# Patient Record
Sex: FEMALE | Race: WHITE | NOT HISPANIC OR LATINO | URBAN - METROPOLITAN AREA
[De-identification: names, ages, dates, MRNs, and addresses within clinical notes are randomized per-mention and may not be internally consistent; named-entity substitution may affect disease eponyms.]

---

## 2015-06-26 RX ORDER — LEVOTHYROXINE SODIUM 125 MCG
1 TABLET ORAL
Qty: 0 | Refills: 0 | DISCHARGE
Start: 2015-06-26

## 2019-12-11 ENCOUNTER — INPATIENT (INPATIENT)
Facility: HOSPITAL | Age: 84
LOS: 6 days | Discharge: HOME | DRG: 391 | End: 2019-12-18
Attending: STUDENT IN AN ORGANIZED HEALTH CARE EDUCATION/TRAINING PROGRAM | Admitting: HOSPITALIST
Payer: MEDICARE

## 2019-12-11 VITALS
HEART RATE: 101 BPM | SYSTOLIC BLOOD PRESSURE: 122 MMHG | WEIGHT: 229.94 LBS | OXYGEN SATURATION: 94 % | RESPIRATION RATE: 18 BRPM | TEMPERATURE: 98 F | HEIGHT: 64 IN | DIASTOLIC BLOOD PRESSURE: 66 MMHG

## 2019-12-11 DIAGNOSIS — Z98.89 OTHER SPECIFIED POSTPROCEDURAL STATES: Chronic | ICD-10-CM

## 2019-12-11 DIAGNOSIS — K52.9 NONINFECTIVE GASTROENTERITIS AND COLITIS, UNSPECIFIED: ICD-10-CM

## 2019-12-11 LAB
ALBUMIN SERPL ELPH-MCNC: 2.7 G/DL — LOW (ref 3.3–5)
ALP SERPL-CCNC: 118 U/L — SIGNIFICANT CHANGE UP (ref 40–120)
ALT FLD-CCNC: 10 U/L — SIGNIFICANT CHANGE UP (ref 10–45)
ANION GAP SERPL CALC-SCNC: 12 MMOL/L — SIGNIFICANT CHANGE UP (ref 5–17)
ANION GAP SERPL CALC-SCNC: 20 MMOL/L — HIGH (ref 5–17)
APPEARANCE UR: ABNORMAL
APTT BLD: 39.7 SEC — HIGH (ref 27.5–36.3)
AST SERPL-CCNC: 16 U/L — SIGNIFICANT CHANGE UP (ref 10–40)
BASE EXCESS BLDV CALC-SCNC: 13.3 MMOL/L — HIGH (ref -2–2)
BASE EXCESS BLDV CALC-SCNC: 14.4 MMOL/L — HIGH (ref -2–2)
BASOPHILS # BLD AUTO: 0.1 K/UL — SIGNIFICANT CHANGE UP (ref 0–0.2)
BASOPHILS NFR BLD AUTO: 0.5 % — SIGNIFICANT CHANGE UP (ref 0–2)
BILIRUB SERPL-MCNC: 0.6 MG/DL — SIGNIFICANT CHANGE UP (ref 0.2–1.2)
BILIRUB UR-MCNC: NEGATIVE — SIGNIFICANT CHANGE UP
BUN SERPL-MCNC: 39 MG/DL — HIGH (ref 7–23)
BUN SERPL-MCNC: 40 MG/DL — HIGH (ref 7–23)
CA-I SERPL-SCNC: 1.07 MMOL/L — LOW (ref 1.12–1.3)
CA-I SERPL-SCNC: 1.13 MMOL/L — SIGNIFICANT CHANGE UP (ref 1.12–1.3)
CALCIUM SERPL-MCNC: 9 MG/DL — SIGNIFICANT CHANGE UP (ref 8.4–10.5)
CALCIUM SERPL-MCNC: 9.6 MG/DL — SIGNIFICANT CHANGE UP (ref 8.4–10.5)
CHLORIDE BLDV-SCNC: 89 MMOL/L — LOW (ref 96–108)
CHLORIDE BLDV-SCNC: 93 MMOL/L — LOW (ref 96–108)
CHLORIDE SERPL-SCNC: 83 MMOL/L — LOW (ref 96–108)
CHLORIDE SERPL-SCNC: 88 MMOL/L — LOW (ref 96–108)
CO2 BLDV-SCNC: 41 MMOL/L — HIGH (ref 22–30)
CO2 BLDV-SCNC: 42 MMOL/L — HIGH (ref 22–30)
CO2 SERPL-SCNC: 31 MMOL/L — SIGNIFICANT CHANGE UP (ref 22–31)
CO2 SERPL-SCNC: 36 MMOL/L — HIGH (ref 22–31)
COLOR SPEC: SIGNIFICANT CHANGE UP
CREAT SERPL-MCNC: 1.33 MG/DL — HIGH (ref 0.5–1.3)
CREAT SERPL-MCNC: 1.36 MG/DL — HIGH (ref 0.5–1.3)
DIFF PNL FLD: NEGATIVE — SIGNIFICANT CHANGE UP
EOSINOPHIL # BLD AUTO: 0.09 K/UL — SIGNIFICANT CHANGE UP (ref 0–0.5)
EOSINOPHIL NFR BLD AUTO: 0.5 % — SIGNIFICANT CHANGE UP (ref 0–6)
GAS PNL BLDV: 129 MMOL/L — LOW (ref 135–145)
GAS PNL BLDV: 135 MMOL/L — SIGNIFICANT CHANGE UP (ref 135–145)
GAS PNL BLDV: SIGNIFICANT CHANGE UP
GLUCOSE BLDV-MCNC: 112 MG/DL — HIGH (ref 70–99)
GLUCOSE BLDV-MCNC: 116 MG/DL — HIGH (ref 70–99)
GLUCOSE SERPL-MCNC: 115 MG/DL — HIGH (ref 70–99)
GLUCOSE SERPL-MCNC: 123 MG/DL — HIGH (ref 70–99)
GLUCOSE UR QL: NEGATIVE — SIGNIFICANT CHANGE UP
HCO3 BLDV-SCNC: 40 MMOL/L — HIGH (ref 21–29)
HCO3 BLDV-SCNC: 40 MMOL/L — HIGH (ref 21–29)
HCT VFR BLD CALC: 34.7 % — SIGNIFICANT CHANGE UP (ref 34.5–45)
HCT VFR BLDA CALC: 32 % — LOW (ref 39–50)
HCT VFR BLDA CALC: 36 % — LOW (ref 39–50)
HGB BLD CALC-MCNC: 10.2 G/DL — LOW (ref 11.5–15.5)
HGB BLD CALC-MCNC: 11.8 G/DL — SIGNIFICANT CHANGE UP (ref 11.5–15.5)
HGB BLD-MCNC: 11.2 G/DL — LOW (ref 11.5–15.5)
IMM GRANULOCYTES NFR BLD AUTO: 0.8 % — SIGNIFICANT CHANGE UP (ref 0–1.5)
INR BLD: 2.11 RATIO — HIGH (ref 0.88–1.16)
KETONES UR-MCNC: NEGATIVE — SIGNIFICANT CHANGE UP
LACTATE BLDV-MCNC: 1.6 MMOL/L — SIGNIFICANT CHANGE UP (ref 0.7–2)
LACTATE BLDV-MCNC: 2.6 MMOL/L — HIGH (ref 0.7–2)
LEUKOCYTE ESTERASE UR-ACNC: ABNORMAL
LIDOCAIN IGE QN: 16 U/L — SIGNIFICANT CHANGE UP (ref 7–60)
LYMPHOCYTES # BLD AUTO: 0.9 K/UL — LOW (ref 1–3.3)
LYMPHOCYTES # BLD AUTO: 4.6 % — LOW (ref 13–44)
MCHC RBC-ENTMCNC: 24.9 PG — LOW (ref 27–34)
MCHC RBC-ENTMCNC: 32.3 GM/DL — SIGNIFICANT CHANGE UP (ref 32–36)
MCV RBC AUTO: 77.3 FL — LOW (ref 80–100)
MONOCYTES # BLD AUTO: 1.22 K/UL — HIGH (ref 0–0.9)
MONOCYTES NFR BLD AUTO: 6.2 % — SIGNIFICANT CHANGE UP (ref 2–14)
NEUTROPHILS # BLD AUTO: 17.28 K/UL — HIGH (ref 1.8–7.4)
NEUTROPHILS NFR BLD AUTO: 87.4 % — HIGH (ref 43–77)
NITRITE UR-MCNC: NEGATIVE — SIGNIFICANT CHANGE UP
NRBC # BLD: 0 /100 WBCS — SIGNIFICANT CHANGE UP (ref 0–0)
PCO2 BLDV: 54 MMHG — HIGH (ref 35–50)
PCO2 BLDV: 61 MMHG — HIGH (ref 35–50)
PH BLDV: 7.43 — SIGNIFICANT CHANGE UP (ref 7.35–7.45)
PH BLDV: 7.48 — HIGH (ref 7.35–7.45)
PH UR: 6.5 — SIGNIFICANT CHANGE UP (ref 5–8)
PLATELET # BLD AUTO: 269 K/UL — SIGNIFICANT CHANGE UP (ref 150–400)
PO2 BLDV: 29 MMHG — SIGNIFICANT CHANGE UP (ref 25–45)
PO2 BLDV: 35 MMHG — SIGNIFICANT CHANGE UP (ref 25–45)
POTASSIUM BLDV-SCNC: 3 MMOL/L — LOW (ref 3.5–5.3)
POTASSIUM BLDV-SCNC: 3.2 MMOL/L — LOW (ref 3.5–5.3)
POTASSIUM SERPL-MCNC: 2.9 MMOL/L — CRITICAL LOW (ref 3.5–5.3)
POTASSIUM SERPL-MCNC: 3.4 MMOL/L — LOW (ref 3.5–5.3)
POTASSIUM SERPL-SCNC: 2.9 MMOL/L — CRITICAL LOW (ref 3.5–5.3)
POTASSIUM SERPL-SCNC: 3.4 MMOL/L — LOW (ref 3.5–5.3)
PROT SERPL-MCNC: 7.3 G/DL — SIGNIFICANT CHANGE UP (ref 6–8.3)
PROT UR-MCNC: NEGATIVE — SIGNIFICANT CHANGE UP
PROTHROM AB SERPL-ACNC: 24.6 SEC — HIGH (ref 10–12.9)
RBC # BLD: 4.49 M/UL — SIGNIFICANT CHANGE UP (ref 3.8–5.2)
RBC # FLD: 18 % — HIGH (ref 10.3–14.5)
SAO2 % BLDV: 48 % — LOW (ref 67–88)
SAO2 % BLDV: 61 % — LOW (ref 67–88)
SODIUM SERPL-SCNC: 134 MMOL/L — LOW (ref 135–145)
SODIUM SERPL-SCNC: 136 MMOL/L — SIGNIFICANT CHANGE UP (ref 135–145)
SP GR SPEC: 1.01 — SIGNIFICANT CHANGE UP (ref 1.01–1.02)
UROBILINOGEN FLD QL: NEGATIVE — SIGNIFICANT CHANGE UP
WBC # BLD: 19.74 K/UL — HIGH (ref 3.8–10.5)
WBC # FLD AUTO: 19.74 K/UL — HIGH (ref 3.8–10.5)

## 2019-12-11 PROCEDURE — 99284 EMERGENCY DEPT VISIT MOD MDM: CPT

## 2019-12-11 PROCEDURE — 71045 X-RAY EXAM CHEST 1 VIEW: CPT | Mod: 26

## 2019-12-11 PROCEDURE — 74177 CT ABD & PELVIS W/CONTRAST: CPT | Mod: 26

## 2019-12-11 PROCEDURE — 99223 1ST HOSP IP/OBS HIGH 75: CPT

## 2019-12-11 RX ORDER — POTASSIUM CHLORIDE 20 MEQ
10 PACKET (EA) ORAL
Refills: 0 | Status: COMPLETED | OUTPATIENT
Start: 2019-12-11 | End: 2019-12-12

## 2019-12-11 RX ORDER — HYDROMORPHONE HYDROCHLORIDE 2 MG/ML
1 INJECTION INTRAMUSCULAR; INTRAVENOUS; SUBCUTANEOUS ONCE
Refills: 0 | Status: DISCONTINUED | OUTPATIENT
Start: 2019-12-11 | End: 2019-12-11

## 2019-12-11 RX ORDER — CIPROFLOXACIN LACTATE 400MG/40ML
VIAL (ML) INTRAVENOUS
Refills: 0 | Status: DISCONTINUED | OUTPATIENT
Start: 2019-12-11 | End: 2019-12-12

## 2019-12-11 RX ORDER — CIPROFLOXACIN LACTATE 400MG/40ML
400 VIAL (ML) INTRAVENOUS EVERY 12 HOURS
Refills: 0 | Status: DISCONTINUED | OUTPATIENT
Start: 2019-12-12 | End: 2019-12-12

## 2019-12-11 RX ORDER — METRONIDAZOLE 500 MG
500 TABLET ORAL ONCE
Refills: 0 | Status: COMPLETED | OUTPATIENT
Start: 2019-12-11 | End: 2019-12-11

## 2019-12-11 RX ORDER — POTASSIUM CHLORIDE 20 MEQ
40 PACKET (EA) ORAL ONCE
Refills: 0 | Status: COMPLETED | OUTPATIENT
Start: 2019-12-11 | End: 2019-12-11

## 2019-12-11 RX ORDER — CIPROFLOXACIN LACTATE 400MG/40ML
400 VIAL (ML) INTRAVENOUS ONCE
Refills: 0 | Status: COMPLETED | OUTPATIENT
Start: 2019-12-11 | End: 2019-12-11

## 2019-12-11 RX ORDER — SODIUM CHLORIDE 9 MG/ML
1000 INJECTION INTRAMUSCULAR; INTRAVENOUS; SUBCUTANEOUS ONCE
Refills: 0 | Status: COMPLETED | OUTPATIENT
Start: 2019-12-11 | End: 2019-12-11

## 2019-12-11 RX ADMIN — HYDROMORPHONE HYDROCHLORIDE 1 MILLIGRAM(S): 2 INJECTION INTRAMUSCULAR; INTRAVENOUS; SUBCUTANEOUS at 23:58

## 2019-12-11 RX ADMIN — SODIUM CHLORIDE 1000 MILLILITER(S): 9 INJECTION INTRAMUSCULAR; INTRAVENOUS; SUBCUTANEOUS at 20:18

## 2019-12-11 RX ADMIN — Medication 200 MILLIGRAM(S): at 23:37

## 2019-12-11 RX ADMIN — Medication 40 MILLIEQUIVALENT(S): at 20:17

## 2019-12-11 RX ADMIN — Medication 100 MILLIEQUIVALENT(S): at 23:58

## 2019-12-11 NOTE — H&P ADULT - NSHPSOCIALHISTORY_GEN_ALL_CORE
Social History:    Marital Status:  (  x )    (   ) Single    (   )    (  )   Occupation: retired  Lives with: (  ) alone  (  ) children   ( x ) spouse   (  ) parents  (  ) other    Substance Use (street drugs): ( x ) never used  (  ) other:  Tobacco Usage:  (   ) never smoked   ( x  ) former smoker   (   ) current smoker  (     ) pack year  (        ) last cigarette date  Alcohol Usage: denies

## 2019-12-11 NOTE — ED ADULT NURSE REASSESSMENT NOTE - NS ED NURSE REASSESS COMMENT FT1
Pt received from [facility] in [place], Pt AOx3  breathing even unlabored spontaneously, pt SPO2 between 88-94%, pt not c/o SOB/ dyspnea, placed on 2L O2 NC for low pulse ox.

## 2019-12-11 NOTE — ED ADULT NURSE NOTE - PSH
H/O heart surgery    History of open heart surgery  biosynthetic aortic 2014  Knee fracture, left    Status post hip replacement  right hip 2006

## 2019-12-11 NOTE — ED ADULT NURSE NOTE - OBJECTIVE STATEMENT
Patient   is  alert  and  oriented  x3  Color  is  good  and  skin  warm to touch.  She  is c/o  rt  flank  and  RLQ  pain    and  constipation.  She   had  a  last  bowel  movement  7  days  ago.

## 2019-12-11 NOTE — ED ADULT NURSE NOTE - PMH
Afib    CHF (congestive heart failure)    HLD (hyperlipidemia)    HTN (hypertension)    Hypothyroid    Neuropathy    PVD (peripheral vascular disease)    Spinal stenosis of lumbar region

## 2019-12-11 NOTE — ED ADULT NURSE REASSESSMENT NOTE - NS ED NURSE REASSESS COMMENT FT1
Attempt made to straight cath Pt AOx4, states "you're not putting that thing in me again. I refuse." ANDREAS Mccarthy notified. Pt reminded to alert RN/ team when needing to urinate so sample can be collected. Attempt made to straight cath pt for urine sample, unsuccessful. Pt AOx4, states "you're not putting that thing in me again. I refuse." ANDREAS Mccarthy notified. Pt reminded to alert RN/ team when needing to urinate so sample can be collected.

## 2019-12-11 NOTE — ED PROVIDER NOTE - PROGRESS NOTE DETAILS
Spoke to Dr Caban who agrees w/ admission and asks to admmit to team diamond Díaz PA-C Patient has known urinary incontinence and intially refusing straight cath. agreed to straight cath and ua shows + uti. will admit for uti, elevated wbc and stercoral proctitis.

## 2019-12-11 NOTE — ED PROVIDER NOTE - OBJECTIVE STATEMENT
84 year year old female w/ PMHx of HTN, HLD, A fib on Coumadin, biosynthetic aortic valve replacement,  hypothyroidism, neuropathy w/ chronic pain presents to ED /o right flank and abdominal pain x 1 week. Patient reports right flank pain radiating to the right side abdomen and right back which occurs daily in waves. She reports she had a kidney stone in the past and feels this may be similar. Normally has BM 1x per week and used enema today w/ successful movement. Denies fevers, chills, chest pain, sob, n/v/d, urinary symptoms

## 2019-12-11 NOTE — H&P ADULT - PROBLEM SELECTOR PLAN 3
-leukocytosis, tachycardia on arrival in setting of stercoral colitis  -afebrile, hemodynamically stable.  Tachycardia and mild lactic acid elevation have resolved.   -cautiously observe off abx as noted above unless pt spikes fever or changes clinically, s/p one dose cipro/flagyl

## 2019-12-11 NOTE — ED PROVIDER NOTE - CLINICAL SUMMARY MEDICAL DECISION MAKING FREE TEXT BOX
Ron: 84 year old female with htn, hld, afib on coumadin here with right flank pain and abdominal pain x  1 week.  feels similar to prior kidney stone per patient. + ttp right uq/rlq. will get labs, ivf, ct a/p, ua, reassess.

## 2019-12-11 NOTE — ED ADULT NURSE REASSESSMENT NOTE - NS ED NURSE REASSESS COMMENT FT1
Pt offered straight cath by urology, pt declined. Pt again reminded to alert RN when ready to provide urine sample.

## 2019-12-11 NOTE — H&P ADULT - ATTENDING COMMENTS
Care to be assumed by day hospitalist at 8 am.  This patient was assigned to me by the hospitalist in charge; my involvement in this case has consisted of the initial history, physical, chart review, and management plan.  This patient was previously unknown to me.  Case endorsed to SEFERINO [id number] [facility] covering ER at ____. Care to be assumed by day hospitalist at 8 am.  This patient was assigned to me by the hospitalist in charge; my involvement in this case has consisted of the initial history, physical, chart review, and management plan.  This patient was previously unknown to me.  Case endorsed to SEFERINO [id number] Loyola covering ER at 1:30 am.  She is aware of outlined plan above including pending labs, K repletion, need for bowel reg, and escalation plan if pt worsens (restart abx, surg eval).

## 2019-12-11 NOTE — H&P ADULT - PROBLEM SELECTOR PLAN 4
-bowel reg as noted  -surgery eval if worsening sx  -defer to day team to re-attempt tap water enema in am as pt is declining currently, is s/p enema at home with partial evacuation.  -can consider trial of relistor if no effect with current regimen.

## 2019-12-11 NOTE — H&P ADULT - NSHPREVIEWOFSYSTEMS_GEN_ALL_CORE
REVIEW OF SYSTEMS:  CONSTITUTIONAL: +weakness. No fevers. No chills. No weight loss. Good appetite.  EYES: No visual changes. No eye pain.  ENT: No hearing difficulty. No vertigo. No dysphagia. No Sinusitis/rhinorrhea.  NECK: No pain. No stiffness/rigidity.  CARDIAC: No chest pain. No palpitations.  RESPIRATORY: No cough. No SOB. No hemoptysis.  GASTROINTESTINAL: +abdominal pain. No nausea. No vomiting. No hematemesis. No diarrhea. ++constipation. No melena. No hematochezia.  GENITOURINARY: No dysuria. No frequency. No hesitancy. No hematuria.  NEUROLOGICAL: No numbness. No focal weakness. No incontinence. No headache.  BACK: No back pain.  EXTREMITIES: No lower extremity edema. Full ROM.  SKIN: No rashes. No itching. No other lesions.  PSYCHIATRIC: No depression. No anxiety. No SI/HI.  ALLERGIC: No lip swelling. No hives.  All other review of systems is negative unless indicated above.

## 2019-12-11 NOTE — H&P ADULT - NSHPLABSRESULTS_GEN_ALL_CORE
Labs personally reviewed : Leukocytosis 19.7, chronic stable anemia, INR 2.11 in setting of coumadin, hypoKalemia 2.9, stable ckd3, Lactate 2.6 --> 1.6.   Imaging personally reviewed CXR prelim KARRIE.  CTAP +stercoral colitis, +pulm edema.   No EKG in chart.

## 2019-12-11 NOTE — H&P ADULT - PROBLEM SELECTOR PLAN 10
Transitions of Care Status:  1.  Name of PCP: Nasir Caban  2.  PCP Contacted on Admission: [ ] Y    [x ] N    3.  PCP contacted at Discharge: [ ] Y    [ ] N    [ ] N/A  4.  Post-Discharge Appointment Date and Location:  5.  Summary of Handoff given to PCP:

## 2019-12-11 NOTE — ED PROVIDER NOTE - PHYSICAL EXAMINATION
CONSTITUTIONAL: Patient is awake, alert and oriented x 3. Patient is well appearing and in no acute distress.  HEAD: NCAT,   NECK: supple,   LUNGS: CTA B/L,  HEART: RRR.+S1S2 no murmurs,   ABDOMEN: Obese, soft nd, + ttp right flank and ruq/rlq,  no rebound or guarding.   EXTREMITY: no edema or calf tenderness b/l, FROM upper and lower ext b/l  NEURO: no focal deficits

## 2019-12-11 NOTE — H&P ADULT - NSHPPHYSICALEXAM_GEN_ALL_CORE
PHYSICAL EXAM:    Vital Signs Last 24 Hrs  T(C): 36.4 (11 Dec 2019 22:59), Max: 36.9 (11 Dec 2019 17:15)  T(F): 97.6 (11 Dec 2019 22:59), Max: 98.4 (11 Dec 2019 17:15)  HR: 73 (11 Dec 2019 22:59) (73 - 101)  BP: 119/61 (11 Dec 2019 22:59) (119/61 - 124/44)  BP(mean): --  RR: 18 (11 Dec 2019 22:59) (18 - 18)  SpO2: 100% (11 Dec 2019 22:59) (94% - 100%)    GENERAL: NAD, morbidly obese, chronically ill appearing female.  HEAD:  Atraumatic, Normocephalic  EYES: EOMI, PERRLA, conjunctiva and sclera clear  ENMT: No tonsillar erythema, exudates, or enlargement; Moist mucous membranes, Good dentition, No lesions  NECK: Supple, No JVD, Normal thyroid  CHEST/LUNG: Clear to percussion bilaterally with minimal fine crackles at bases; no wheezing, or rubs no resp distress or accessory muscle usage.   HEART: Regular rate and rhythm; No murmurs, rubs, or gallops, 1+ edema b/l LE apparently chronic per pt.   ABDOMEN: Soft, +TTP over RLQ/flank. Nondistended; Bowel sounds present. no rebound/guarding.  EXTREMITIES:  2+ Peripheral Pulses, No clubbing, cyanosis.   LYMPH: No lymphadenopathy noted, no lymphangitis  SKIN: +venous stasis changes b/l LE with bluish discolaration of shins b/l. warm skin b/l LE.   NERVOUS SYSTEM:  Alert & Oriented X2, Good concentration; Motor Strength 5/5 B/L upper and lower extremities. no facial droop or dysarthria. PHYSICAL EXAM:    Vital Signs Last 24 Hrs  T(C): 36.4 (11 Dec 2019 22:59), Max: 36.9 (11 Dec 2019 17:15)  T(F): 97.6 (11 Dec 2019 22:59), Max: 98.4 (11 Dec 2019 17:15)  HR: 73 (11 Dec 2019 22:59) (73 - 101)  BP: 119/61 (11 Dec 2019 22:59) (119/61 - 124/44)  BP(mean): --  RR: 18 (11 Dec 2019 22:59) (18 - 18)  SpO2: 100% (11 Dec 2019 22:59) (94% - 100%)    GENERAL: NAD, morbidly obese, chronically ill appearing female.  HEAD:  Atraumatic, Normocephalic  EYES: EOMI, PERRLA, conjunctiva and sclera clear  ENMT: No tonsillar erythema, exudates, or enlargement; Moist mucous membranes, Good dentition, No lesions  NECK: Supple, No JVD, Normal thyroid  CHEST/LUNG: Clear to percussion bilaterally with minimal fine crackles at bases; no wheezing, or rubs no resp distress or accessory muscle usage.   HEART: Regular rate and rhythm; No murmurs, rubs, or gallops, 1+ edema b/l LE apparently chronic per pt.   ABDOMEN: Soft, +TTP over RLQ/flank. Nondistended; Bowel sounds present. no rebound/guarding.  EXTREMITIES:  2+ Peripheral Pulses, No clubbing, cyanosis.   LYMPH: No lymphadenopathy noted, no lymphangitis  SKIN: +venous stasis changes b/l LE with bluish discolaration of shins b/l. warm skin b/l LE.   NERVOUS SYSTEM:  Alert & Oriented X2, Good concentration; Motor Strength 5/5 B/L upper and lower extremities. no facial droop or dysarthria.  Pt is refusing bowel disimpaction or rectal exam at this time.

## 2019-12-11 NOTE — H&P ADULT - PROBLEM SELECTOR PLAN 1
-stercoral colitis with large stool burden on CTAP  -leukocytosis but afebrile and hemodynamically stable. No other obvious source of infection. Lactate mildly elevated, suspect 2/2 local bowel ischemia from large stool burden as pt does not appear to be hypovolemic currently.  -s/p cipro/flagyl IV per ER, for now will hold further antibiotics as I suspect constipation and local ischemia is involved in her colitis and it is not of a true infectious nature.  Localized abd pain but not diarrhea/n/v, no fever, lactate has since downtrended.   -Cautiously observe off abx for now  -Needs bowel regimen: start senna, miralax daily.  Avoid fleet enema 2/2 presentation with severe hypokalemia.  Would prefer tap water enema but pt is declining at this time 2/2 pain. will monitor and can offer tap/saline enema again in am.    -low threshold for surgical eval for any worsening of abd pain in setting of stercoral colitis.  Trend lactate.

## 2019-12-11 NOTE — H&P ADULT - NSICDXPASTMEDICALHX_GEN_ALL_CORE_FT
PAST MEDICAL HISTORY:  Afib     CHF (congestive heart failure)     HLD (hyperlipidemia)     HTN (hypertension)     Hypothyroid     Neuropathy     PVD (peripheral vascular disease)     Spinal stenosis of lumbar region

## 2019-12-11 NOTE — ED ADULT NURSE REASSESSMENT NOTE - NS ED NURSE REASSESS COMMENT FT1
pt staight cathed under sterile technique with 2 RNs at bedside. pt tolerated. 500mL of cloudy, yellow urine drained u/a and culture sent to lab. pt requesting pain medication, states "I take hydromorphone at home don't you dare come back into this room with tylenol, either come back with hydromorphone or don't come back." MD aware.

## 2019-12-11 NOTE — H&P ADULT - HISTORY OF PRESENT ILLNESS
84 F PMH Afib on Coumadin, AVR, HTN, HLD, CKD3, hip fx, hypothyroidism, neuropathy with multiple analgesic allergies on dilaudid/gabapentin, chronic constipation, p/w R sided abdominal pain.  +constant pain x few days. Last BM >8 days ago, usually goes once a week at most. Today pt had her HHA give her an enema with some stool output, but this was stopped 2/2 abd pain. [place] cp/sob/f/c/n/v/d/dysuria/cough. Denies hematochezia/melena. Ordinarily takes senna/colace, occasionally lactulose. No travel/sick contacts. Collateral obtained from , in agreement with history.     Vs: 97.6, 101 --> 73, 119/61, 18, 94% RA.  Labs: Leukocytosis 19.7, chronic stable anemia, INR 2.11 in setting of coumadin, hypoKalemia 2.9, stable ckd3, Lactate 2.6 --> 1.6. CXR prelim KARRIE.  CTAP +stercoral colitis, +pulm edema. In ER pt received IV cipro/flagyl, potassium 40 po + 3 runs IV, IVF prior to medicine team involvement. 84 F PMH Afib on Coumadin, AVR, HTN, HLD, CKD3, hip fx, hypothyroidism, neuropathy with multiple analgesic allergies on dilaudid/gabapentin, chronic constipation, p/w R sided abdominal pain.  +constant pain x few days. Last BM >8 days ago, usually goes once a week at most. Today pt had her HHA give her an enema with some stool output, but this was stopped 2/2 abd pain. [place] cp/sob/f/c/n/v/d/dysuria/cough. Denies hematochezia/melena. Ordinarily takes senna/colace, occasionally lactulose. No travel/sick contacts. Collateral obtained from , in agreement with history. Pt is bed bound and dependent on all ADLs.   also notes increasing forgetfullness and mood swings at home.     Vs: 97.6, 101 --> 73, 119/61, 18, 94% RA.  Labs: Leukocytosis 19.7, chronic stable anemia, INR 2.11 in setting of coumadin, hypoKalemia 2.9, stable ckd3, Lactate 2.6 --> 1.6. CXR prelim KARRIE.  CTAP +stercoral colitis, +pulm edema. In ER pt received IV cipro/flagyl, potassium 40 po + 3 runs IV, IVF prior to medicine team involvement.

## 2019-12-11 NOTE — ED ADULT NURSE NOTE - NSIMPLEMENTINTERV_GEN_ALL_ED
Implemented All Fall Risk Interventions:  [place]y to call system. Call bell, personal items and telephone within reach. Instruct patient to call for assistance. Room bathroom lighting operational. Non-slip footwear when patient is off stretcher. Physically safe environment: no spills, clutter or unnecessary equipment. Stretcher in lowest position, wheels locked, appropriate side rails in place. Provide visual cue, wrist band, yellow gown, etc. Monitor gait and stability. Monitor for mental status changes and reorient to person, place, and time. Review medications for side effects contributing to fall risk. Reinforce activity limits and safety measures with patient and family.

## 2019-12-11 NOTE — H&P ADULT - ASSESSMENT
84 F PMH Afib on Coumadin, AVR, HTN, HLD, CKD3, hip fx, hypothyroidism, neuropathy with multiple analgesic allergies on dilaudid/gabapentin, chronic constipation, p/w R sided abdominal pain, f/w leukocytosis in setting of stercoral colitis and hypokalemia on admission labs.

## 2019-12-11 NOTE — H&P ADULT - NSICDXPASTSURGICALHX_GEN_ALL_CORE_FT
PAST SURGICAL HISTORY:  H/O heart surgery     History of open heart surgery biosynthetic aortic 2014    Knee fracture, left     Status post hip replacement right hip 2006

## 2019-12-11 NOTE — H&P ADULT - PROBLEM SELECTOR PLAN 2
-s/p PO and ongoing IV repletion  -trend cmp q12 hrs at least and with repletion  -avoid fleet enemas  -temporarily hold home diuretics for severe hypokalemia.  There is noted radiographic evidence of pulm edema but pt at this time does not appear decompensated; is not in resp distress, has baseline LE edema, is laying fairly flat without issue, saturating 94-96% on RA.  Would resume home diuretics when severe hypoK is repleted consider outpatient standing K supplements orally if pt is persistently hypokalemic, to be evaluated by day team.

## 2019-12-12 DIAGNOSIS — E87.6 HYPOKALEMIA: ICD-10-CM

## 2019-12-12 DIAGNOSIS — K59.09 OTHER CONSTIPATION: ICD-10-CM

## 2019-12-12 DIAGNOSIS — I48.0 PAROXYSMAL ATRIAL FIBRILLATION: ICD-10-CM

## 2019-12-12 DIAGNOSIS — R65.10 SYSTEMIC INFLAMMATORY RESPONSE SYNDROME (SIRS) OF NON-INFECTIOUS ORIGIN WITHOUT ACUTE ORGAN DYSFUNCTION: ICD-10-CM

## 2019-12-12 DIAGNOSIS — Z29.9 ENCOUNTER FOR PROPHYLACTIC MEASURES, UNSPECIFIED: ICD-10-CM

## 2019-12-12 DIAGNOSIS — K52.9 NONINFECTIVE GASTROENTERITIS AND COLITIS, UNSPECIFIED: ICD-10-CM

## 2019-12-12 DIAGNOSIS — I50.22 CHRONIC SYSTOLIC (CONGESTIVE) HEART FAILURE: ICD-10-CM

## 2019-12-12 DIAGNOSIS — Z02.9 ENCOUNTER FOR ADMINISTRATIVE EXAMINATIONS, UNSPECIFIED: ICD-10-CM

## 2019-12-12 DIAGNOSIS — I10 ESSENTIAL (PRIMARY) HYPERTENSION: ICD-10-CM

## 2019-12-12 DIAGNOSIS — N18.3 CHRONIC KIDNEY DISEASE, STAGE 3 (MODERATE): ICD-10-CM

## 2019-12-12 LAB
ALBUMIN SERPL ELPH-MCNC: 2.6 G/DL — LOW (ref 3.3–5)
ALP SERPL-CCNC: 119 U/L — SIGNIFICANT CHANGE UP (ref 40–120)
ALT FLD-CCNC: 7 U/L — LOW (ref 10–45)
ANION GAP SERPL CALC-SCNC: 14 MMOL/L — SIGNIFICANT CHANGE UP (ref 5–17)
APTT BLD: 40.4 SEC — HIGH (ref 27.5–36.3)
AST SERPL-CCNC: 13 U/L — SIGNIFICANT CHANGE UP (ref 10–40)
BACTERIA # UR AUTO: ABNORMAL
BASOPHILS # BLD AUTO: 0.1 K/UL — SIGNIFICANT CHANGE UP (ref 0–0.2)
BASOPHILS NFR BLD AUTO: 0.5 % — SIGNIFICANT CHANGE UP (ref 0–2)
BILIRUB SERPL-MCNC: 0.5 MG/DL — SIGNIFICANT CHANGE UP (ref 0.2–1.2)
BUN SERPL-MCNC: 40 MG/DL — HIGH (ref 7–23)
CALCIUM SERPL-MCNC: 9.3 MG/DL — SIGNIFICANT CHANGE UP (ref 8.4–10.5)
CHLORIDE SERPL-SCNC: 90 MMOL/L — LOW (ref 96–108)
CO2 SERPL-SCNC: 33 MMOL/L — HIGH (ref 22–31)
COMMENT - URINE: SIGNIFICANT CHANGE UP
CREAT SERPL-MCNC: 1.48 MG/DL — HIGH (ref 0.5–1.3)
EOSINOPHIL # BLD AUTO: 0.3 K/UL — SIGNIFICANT CHANGE UP (ref 0–0.5)
EOSINOPHIL NFR BLD AUTO: 1.4 % — SIGNIFICANT CHANGE UP (ref 0–6)
EPI CELLS # UR: 0 — SIGNIFICANT CHANGE UP
GLUCOSE SERPL-MCNC: 128 MG/DL — HIGH (ref 70–99)
HCT VFR BLD CALC: 34.4 % — LOW (ref 34.5–45)
HGB BLD-MCNC: 10.7 G/DL — LOW (ref 11.5–15.5)
HYALINE CASTS # UR AUTO: 0 /LPF — SIGNIFICANT CHANGE UP (ref 0–7)
IMM GRANULOCYTES NFR BLD AUTO: 0.7 % — SIGNIFICANT CHANGE UP (ref 0–1.5)
INR BLD: 2.27 RATIO — HIGH (ref 0.88–1.16)
LACTATE SERPL-SCNC: 2.3 MMOL/L — HIGH (ref 0.7–2)
LYMPHOCYTES # BLD AUTO: 0.62 K/UL — LOW (ref 1–3.3)
LYMPHOCYTES # BLD AUTO: 3 % — LOW (ref 13–44)
MAGNESIUM SERPL-MCNC: 2.2 MG/DL — SIGNIFICANT CHANGE UP (ref 1.6–2.6)
MCHC RBC-ENTMCNC: 24.9 PG — LOW (ref 27–34)
MCHC RBC-ENTMCNC: 31.1 GM/DL — LOW (ref 32–36)
MCV RBC AUTO: 80 FL — SIGNIFICANT CHANGE UP (ref 80–100)
MONOCYTES # BLD AUTO: 1.13 K/UL — HIGH (ref 0–0.9)
MONOCYTES NFR BLD AUTO: 5.4 % — SIGNIFICANT CHANGE UP (ref 2–14)
NEUTROPHILS # BLD AUTO: 18.6 K/UL — HIGH (ref 1.8–7.4)
NEUTROPHILS NFR BLD AUTO: 89 % — HIGH (ref 43–77)
PHOSPHATE SERPL-MCNC: 4.2 MG/DL — SIGNIFICANT CHANGE UP (ref 2.5–4.5)
PLATELET # BLD AUTO: 285 K/UL — SIGNIFICANT CHANGE UP (ref 150–400)
POTASSIUM SERPL-MCNC: 3.8 MMOL/L — SIGNIFICANT CHANGE UP (ref 3.5–5.3)
POTASSIUM SERPL-SCNC: 3.8 MMOL/L — SIGNIFICANT CHANGE UP (ref 3.5–5.3)
PROT SERPL-MCNC: 6.9 G/DL — SIGNIFICANT CHANGE UP (ref 6–8.3)
PROTHROM AB SERPL-ACNC: 26.6 SEC — HIGH (ref 10–13.1)
RBC # BLD: 4.3 M/UL — SIGNIFICANT CHANGE UP (ref 3.8–5.2)
RBC # FLD: 18.4 % — HIGH (ref 10.3–14.5)
RBC CASTS # UR COMP ASSIST: 2 /HPF — SIGNIFICANT CHANGE UP (ref 0–4)
SODIUM SERPL-SCNC: 137 MMOL/L — SIGNIFICANT CHANGE UP (ref 135–145)
WBC # BLD: 20.89 K/UL — HIGH (ref 3.8–10.5)
WBC # FLD AUTO: 20.89 K/UL — HIGH (ref 3.8–10.5)
WBC UR QL: 180 /HPF — HIGH (ref 0–5)

## 2019-12-12 PROCEDURE — 99233 SBSQ HOSP IP/OBS HIGH 50: CPT

## 2019-12-12 RX ORDER — SENNA PLUS 8.6 MG/1
2 TABLET ORAL AT BEDTIME
Refills: 0 | Status: DISCONTINUED | OUTPATIENT
Start: 2019-12-12 | End: 2019-12-18

## 2019-12-12 RX ORDER — HYDROMORPHONE HYDROCHLORIDE 2 MG/ML
2 INJECTION INTRAMUSCULAR; INTRAVENOUS; SUBCUTANEOUS EVERY 6 HOURS
Refills: 0 | Status: DISCONTINUED | OUTPATIENT
Start: 2019-12-12 | End: 2019-12-18

## 2019-12-12 RX ORDER — SIMVASTATIN 20 MG/1
1 TABLET, FILM COATED ORAL
Qty: 0 | Refills: 0 | DISCHARGE

## 2019-12-12 RX ORDER — WARFARIN SODIUM 2.5 MG/1
1.5 TABLET ORAL ONCE
Refills: 0 | Status: COMPLETED | OUTPATIENT
Start: 2019-12-12 | End: 2019-12-12

## 2019-12-12 RX ORDER — ISOSORBIDE MONONITRATE 60 MG/1
30 TABLET, EXTENDED RELEASE ORAL DAILY
Refills: 0 | Status: DISCONTINUED | OUTPATIENT
Start: 2019-12-12 | End: 2019-12-18

## 2019-12-12 RX ORDER — SIMVASTATIN 20 MG/1
40 TABLET, FILM COATED ORAL AT BEDTIME
Refills: 0 | Status: DISCONTINUED | OUTPATIENT
Start: 2019-12-12 | End: 2019-12-18

## 2019-12-12 RX ORDER — DILTIAZEM HCL 120 MG
120 CAPSULE, EXT RELEASE 24 HR ORAL DAILY
Refills: 0 | Status: DISCONTINUED | OUTPATIENT
Start: 2019-12-12 | End: 2019-12-18

## 2019-12-12 RX ORDER — GABAPENTIN 400 MG/1
1 CAPSULE ORAL
Qty: 0 | Refills: 0 | DISCHARGE

## 2019-12-12 RX ORDER — ISOSORBIDE MONONITRATE 60 MG/1
1 TABLET, EXTENDED RELEASE ORAL
Qty: 0 | Refills: 0 | DISCHARGE

## 2019-12-12 RX ORDER — METOPROLOL TARTRATE 50 MG
1 TABLET ORAL
Qty: 0 | Refills: 0 | DISCHARGE

## 2019-12-12 RX ORDER — GABAPENTIN 400 MG/1
400 CAPSULE ORAL
Refills: 0 | Status: DISCONTINUED | OUTPATIENT
Start: 2019-12-12 | End: 2019-12-18

## 2019-12-12 RX ORDER — METOPROLOL TARTRATE 50 MG
100 TABLET ORAL DAILY
Refills: 0 | Status: DISCONTINUED | OUTPATIENT
Start: 2019-12-12 | End: 2019-12-18

## 2019-12-12 RX ORDER — POLYETHYLENE GLYCOL 3350 17 G/17G
17 POWDER, FOR SOLUTION ORAL
Refills: 0 | Status: DISCONTINUED | OUTPATIENT
Start: 2019-12-12 | End: 2019-12-18

## 2019-12-12 RX ORDER — LEVOTHYROXINE SODIUM 125 MCG
150 TABLET ORAL DAILY
Refills: 0 | Status: DISCONTINUED | OUTPATIENT
Start: 2019-12-12 | End: 2019-12-18

## 2019-12-12 RX ORDER — FUROSEMIDE 40 MG
1 TABLET ORAL
Qty: 0 | Refills: 0 | DISCHARGE

## 2019-12-12 RX ADMIN — Medication 100 MILLIGRAM(S): at 06:20

## 2019-12-12 RX ADMIN — HYDROMORPHONE HYDROCHLORIDE 2 MILLIGRAM(S): 2 INJECTION INTRAMUSCULAR; INTRAVENOUS; SUBCUTANEOUS at 22:20

## 2019-12-12 RX ADMIN — HYDROMORPHONE HYDROCHLORIDE 2 MILLIGRAM(S): 2 INJECTION INTRAMUSCULAR; INTRAVENOUS; SUBCUTANEOUS at 21:44

## 2019-12-12 RX ADMIN — GABAPENTIN 400 MILLIGRAM(S): 400 CAPSULE ORAL at 06:20

## 2019-12-12 RX ADMIN — SENNA PLUS 2 TABLET(S): 8.6 TABLET ORAL at 21:44

## 2019-12-12 RX ADMIN — POLYETHYLENE GLYCOL 3350 17 GRAM(S): 17 POWDER, FOR SOLUTION ORAL at 17:13

## 2019-12-12 RX ADMIN — HYDROMORPHONE HYDROCHLORIDE 2 MILLIGRAM(S): 2 INJECTION INTRAMUSCULAR; INTRAVENOUS; SUBCUTANEOUS at 08:17

## 2019-12-12 RX ADMIN — Medication 1 TABLET(S): at 14:00

## 2019-12-12 RX ADMIN — Medication 120 MILLIGRAM(S): at 06:21

## 2019-12-12 RX ADMIN — HYDROMORPHONE HYDROCHLORIDE 2 MILLIGRAM(S): 2 INJECTION INTRAMUSCULAR; INTRAVENOUS; SUBCUTANEOUS at 14:00

## 2019-12-12 RX ADMIN — Medication 100 MILLIEQUIVALENT(S): at 01:52

## 2019-12-12 RX ADMIN — Medication 100 MILLIGRAM(S): at 00:46

## 2019-12-12 RX ADMIN — Medication 150 MICROGRAM(S): at 06:21

## 2019-12-12 RX ADMIN — HYDROMORPHONE HYDROCHLORIDE 1 MILLIGRAM(S): 2 INJECTION INTRAMUSCULAR; INTRAVENOUS; SUBCUTANEOUS at 01:00

## 2019-12-12 RX ADMIN — SIMVASTATIN 40 MILLIGRAM(S): 20 TABLET, FILM COATED ORAL at 21:44

## 2019-12-12 RX ADMIN — HYDROMORPHONE HYDROCHLORIDE 2 MILLIGRAM(S): 2 INJECTION INTRAMUSCULAR; INTRAVENOUS; SUBCUTANEOUS at 14:30

## 2019-12-12 RX ADMIN — GABAPENTIN 400 MILLIGRAM(S): 400 CAPSULE ORAL at 17:13

## 2019-12-12 RX ADMIN — WARFARIN SODIUM 1.5 MILLIGRAM(S): 2.5 TABLET ORAL at 21:44

## 2019-12-12 RX ADMIN — HYDROMORPHONE HYDROCHLORIDE 2 MILLIGRAM(S): 2 INJECTION INTRAMUSCULAR; INTRAVENOUS; SUBCUTANEOUS at 07:47

## 2019-12-12 RX ADMIN — ISOSORBIDE MONONITRATE 30 MILLIGRAM(S): 60 TABLET, EXTENDED RELEASE ORAL at 14:00

## 2019-12-12 RX ADMIN — POLYETHYLENE GLYCOL 3350 17 GRAM(S): 17 POWDER, FOR SOLUTION ORAL at 01:52

## 2019-12-12 RX ADMIN — Medication 100 MILLIEQUIVALENT(S): at 03:44

## 2019-12-12 RX ADMIN — WARFARIN SODIUM 1.5 MILLIGRAM(S): 2.5 TABLET ORAL at 01:51

## 2019-12-12 NOTE — PROVIDER CONTACT NOTE (OTHER) - ASSESSMENT
Pt AOx4, refusing turn and position for full skin assessment, stating her skin is "fine in the back." Pt with BL stage III pressure ulcers and venous statis ulcers. BL LE cyanotic. VSS.

## 2019-12-12 NOTE — PHYSICAL THERAPY INITIAL EVALUATION ADULT - PATIENT/FAMILY AGREES WITH PLAN
yes/Subacute Rehab, Pt/family may have preference for home; if D/C home will require total assist lift for OOB to chair

## 2019-12-12 NOTE — PHYSICAL THERAPY INITIAL EVALUATION ADULT - TRANSFER TRAINING, PT EVAL
GOAL: Pt will perform ALL transfers with Mod assist, w/use of appropriate assistive device as needed, in 4 weeks.

## 2019-12-12 NOTE — PHYSICAL THERAPY INITIAL EVALUATION ADULT - PRECAUTIONS/LIMITATIONS, REHAB EVAL
Denies cp/sob/f/c/n/v/d/dysuria/cough. Denies hematochezia/melena. Ordinarily takes senna/colace, occasionally lactulose. No travel/sick contacts. Collateral obtained from , in agreement with history. Pt is bed bound and dependent on all ADLs.   also notes increasing forgetfullness and mood swings at home. Vs: 97.6, 101 --> 73, 119/61, 18, 94% RA.  Labs: Leukocytosis 19.7, chronic stable anemia, INR 2.11 in setting of coumadin, hypoKalemia 2.9, stable ckd3, Lactate 2.6 --> 1.6. CXR prelim KARRIE.  CTAP +stercoral colitis, +pulm edema. In ER pt received IV cipro/flagyl, potassium 40 po + 3 runs IV, IVF prior to medicine team involvement. IMAGING RESULTS: chest x-ray impression clear lungs. CT abdomen and pelvis: Cardiomegaly and possible pulmonary edema, Normal appendix. Question stercoral proctitis. Denies cp/sob/f/c/n/v/d/dysuria/cough. Denies hematochezia/melena. Ordinarily takes senna/colace, occasionally lactulose. No travel/sick contacts. Collateral obtained from , in agreement with history. Pt is bed bound and dependent on all ADLs.   also notes increasing forgetfullness and mood swings at home. Vs: 97.6, 101 --> 73, 119/61, 18, 94% RA.  Labs: Leukocytosis 19.7, chronic stable anemia, INR 2.11 in setting of coumadin, hypoKalemia 2.9, stable ckd3, Lactate 2.6 --> 1.6. CXR prelim KARRIE.  CTAP +stercoral colitis, +pulm edema. In ER pt received IV cipro/flagyl, potassium 40 po + 3 runs IV, IVF prior to medicine team involvement. IMAGING RESULTS: chest x-ray impression clear lungs. CT abdomen and pelvis: Cardiomegaly and possible pulmonary edema, Normal appendix. Question stercoral proctitis./fall precautions

## 2019-12-12 NOTE — PROGRESS NOTE ADULT - SUBJECTIVE AND OBJECTIVE BOX
Pedro Pablo Mock MD  Pager [phone]  Spectra [phone]  Cell Phone [phone]    Patient is a 84y old  Female who presents with a chief complaint of stercoral colitis, hypokalemia (11 Dec 2019 23:50)        SUBJECTIVE / OVERNIGHT EVENTS: Patient continues to have R sided abd discomfort. No BM for 8 days      MEDICATIONS  (STANDING):  diltiazem    milliGRAM(s) Oral daily  gabapentin 400 milliGRAM(s) Oral two times a day  isosorbide   mononitrate ER Tablet (IMDUR) 30 milliGRAM(s) Oral daily  levothyroxine 150 MICROGram(s) Oral daily  metoprolol succinate  milliGRAM(s) Oral daily  multivitamin 1 Tablet(s) Oral daily  polyethylene glycol 3350 17 Gram(s) Oral two times a day  senna 2 Tablet(s) Oral at bedtime  simvastatin 40 milliGRAM(s) Oral at bedtime    MEDICATIONS  (PRN):  HYDROmorphone   Tablet 2 milliGRAM(s) Oral every 6 hours PRN Severe Pain (7 - 10)      Vital Signs Last 24 Hrs  T(C): 36.2 (12 Dec 2019 08:27), Max: 36.9 (11 Dec 2019 17:15)  T(F): 97.2 (12 Dec 2019 08:27), Max: 98.4 (11 Dec 2019 17:15)  HR: 61 (12 Dec 2019 08:27) (61 - 101)  BP: 110/50 (12 Dec 2019 08:27) (104/63 - 124/44)  BP(mean): --  RR: 18 (12 Dec 2019 08:27) (18 - 18)  SpO2: 100% (12 Dec 2019 08:27) (94% - 100%)  CAPILLARY BLOOD GLUCOSE        I&O's Summary        PHYSICAL EXAM  GENERAL: NAD, well-developed  HEAD:  Atraumatic, Normocephalic  EYES: EOMI, PERRLA, conjunctiva and sclera clear  CHEST/LUNG: Clear to auscultation anteriorly  HEART: +S1+S2  ABDOMEN: Soft, Diffusely tender to deep palpation, localizing to R side. Nondistended; Bowel sounds present  EXTREMITIES:  Chronic venostasis changes of b/l LE's. 1 + pedal edema. B/l heals bandaged, no drainage.   PSYCH: AAOx3      LABS:                        11.2   19.74 )-----------( 269      ( 11 Dec 2019 18:28 )             34.7     12-    136  |  88<L>  |  40<H>  ----------------------------<  115<H>  2.9<LL>   |  36<H>  |  1.33<H>    Ca    9.0      11 Dec 2019 22:23    TPro  7.3  /  Alb  2.7<L>  /  TBili  0.6  /  DBili  x   /  AST  16  /  ALT  10  /  AlkPhos  118  12-11    PT/INR - ( 11 Dec 2019 18:28 )   PT: 24.6 sec;   INR: 2.11 ratio         PTT - ( 11 Dec 2019 18:28 )  PTT:39.7 sec      Urinalysis Basic - ( 11 Dec 2019 23:14 )    Color: Light Yellow / Appearance: Slightly Turbid / S.014 / pH: x  Gluc: x / Ketone: Negative  / Bili: Negative / Urobili: Negative   Blood: x / Protein: Negative / Nitrite: Negative   Leuk Esterase: Large / RBC: 2 /hpf /  /HPF   Sq Epi: x / Non Sq Epi: 0 / Bacteria: Few          RADIOLOGY & ADDITIONAL TESTS:    Imaging Personally Reviewed:  Consultant(s) Notes Reviewed:    Care Discussed with Consultants/Other Providers:

## 2019-12-12 NOTE — PHYSICAL THERAPY INITIAL EVALUATION ADULT - PERTINENT HX OF CURRENT PROBLEM, REHAB EVAL
84 F PMH Afib on Coumadin, AVR, HTN, HLD, CKD3, hip fx, hypothyroidism, neuropathy with multiple analgesic allergies on dilaudid/gabapentin, chronic constipation, p/w R sided abdominal pain. +constant pain x few days. Last BM >8 days ago, usually goes once a week at most. Today pt had her HHA give her an enema with some stool output, but this was stopped 2/2 abd pain.  CONTINUED BELOW

## 2019-12-12 NOTE — PHYSICAL THERAPY INITIAL EVALUATION ADULT - STRENGTHENING, PT EVAL
GOAL: Pt will improve bilateral LE strength to 3/5, for increased limb stability, to improve ability to transfer in 4 weeks.

## 2019-12-12 NOTE — PROGRESS NOTE ADULT - ASSESSMENT
84 F PMH Afib on Coumadin, AVR, HTN, HLD, CKD stage 3, hip fx, hypothyroidism, neuropathy with multiple analgesic allergies on dilaudid/gabapentin, chronic constipation, p/w R sided abdominal pain and leukocytosis in setting of stercoral colitis and hypokalemia.

## 2019-12-12 NOTE — PHYSICAL THERAPY INITIAL EVALUATION ADULT - ACTIVE RANGE OF MOTION EXAMINATION, REHAB EVAL
bilateral upper extremity Active ROM was WFL (within functional limits)/LLE - no active ROM; PROM not assessed due to pain; RLE ROM limited but able to actively flex/extend hip/knee

## 2019-12-12 NOTE — PHYSICAL THERAPY INITIAL EVALUATION ADULT - GROSSLY INTACT, SENSORY
except: bilateral hands mild impairment; bilateral Feet signficant impairment to light touch/Grossly Intact

## 2019-12-12 NOTE — PHYSICAL THERAPY INITIAL EVALUATION ADULT - DISCHARGE DISPOSITION, PT EVAL
rehabilitation facility/If pt/family decide to go home; Pt will benefit from total assist lift for OOB to chair and home PT

## 2019-12-12 NOTE — PROGRESS NOTE ADULT - PROBLEM SELECTOR PLAN 1
Stercoral colitis with large stool burden on CTAP  Cont senna, Miralax daily.    Offer tap water enema once in a room.

## 2019-12-12 NOTE — PHYSICAL THERAPY INITIAL EVALUATION ADULT - ADDITIONAL COMMENTS
Pt lives in private house with spouse. Pt has HHA all day. Per patient, spouse and HHA over the past month pts functional status has significantly declined. Early November Pt was Getting OOB to chair daily performing standing transfers with assist of HHA, over the past few weeks transfers got much worse and patient has been "bed bound" for 4 weeks. Pt and  state home PT stopped around that time.

## 2019-12-13 ENCOUNTER — TRANSCRIPTION ENCOUNTER (OUTPATIENT)
Age: 84
End: 2019-12-13

## 2019-12-13 DIAGNOSIS — N17.9 ACUTE KIDNEY FAILURE, UNSPECIFIED: ICD-10-CM

## 2019-12-13 DIAGNOSIS — F43.24 ADJUSTMENT DISORDER WITH DISTURBANCE OF CONDUCT: ICD-10-CM

## 2019-12-13 DIAGNOSIS — L97.409 NON-PRESSURE CHRONIC ULCER OF UNSPECIFIED HEEL AND MIDFOOT WITH UNSPECIFIED SEVERITY: ICD-10-CM

## 2019-12-13 LAB
ALBUMIN SERPL ELPH-MCNC: 2.7 G/DL — LOW (ref 3.3–5)
ALP SERPL-CCNC: 111 U/L — SIGNIFICANT CHANGE UP (ref 40–120)
ALT FLD-CCNC: 10 U/L — SIGNIFICANT CHANGE UP (ref 10–45)
ANION GAP SERPL CALC-SCNC: 12 MMOL/L — SIGNIFICANT CHANGE UP (ref 5–17)
ANION GAP SERPL CALC-SCNC: 13 MMOL/L — SIGNIFICANT CHANGE UP (ref 5–17)
AST SERPL-CCNC: 13 U/L — SIGNIFICANT CHANGE UP (ref 10–40)
BILIRUB SERPL-MCNC: 0.5 MG/DL — SIGNIFICANT CHANGE UP (ref 0.2–1.2)
BUN SERPL-MCNC: 41 MG/DL — HIGH (ref 7–23)
BUN SERPL-MCNC: 43 MG/DL — HIGH (ref 7–23)
CALCIUM SERPL-MCNC: 9.1 MG/DL — SIGNIFICANT CHANGE UP (ref 8.4–10.5)
CALCIUM SERPL-MCNC: 9.5 MG/DL — SIGNIFICANT CHANGE UP (ref 8.4–10.5)
CHLORIDE SERPL-SCNC: 89 MMOL/L — LOW (ref 96–108)
CHLORIDE SERPL-SCNC: 91 MMOL/L — LOW (ref 96–108)
CO2 SERPL-SCNC: 32 MMOL/L — HIGH (ref 22–31)
CO2 SERPL-SCNC: 33 MMOL/L — HIGH (ref 22–31)
CREAT SERPL-MCNC: 1.84 MG/DL — HIGH (ref 0.5–1.3)
CREAT SERPL-MCNC: 1.86 MG/DL — HIGH (ref 0.5–1.3)
CRP SERPL-MCNC: 19.57 MG/DL — HIGH (ref 0–0.4)
CULTURE RESULTS: SIGNIFICANT CHANGE UP
ERYTHROCYTE [SEDIMENTATION RATE] IN BLOOD: 120 MM/HR — HIGH (ref 0–20)
GLUCOSE SERPL-MCNC: 88 MG/DL — SIGNIFICANT CHANGE UP (ref 70–99)
GLUCOSE SERPL-MCNC: 92 MG/DL — SIGNIFICANT CHANGE UP (ref 70–99)
HCT VFR BLD CALC: 33.7 % — LOW (ref 34.5–45)
HGB BLD-MCNC: 10.6 G/DL — LOW (ref 11.5–15.5)
INR BLD: 2.44 RATIO — HIGH (ref 0.88–1.16)
MAGNESIUM SERPL-MCNC: 2.2 MG/DL — SIGNIFICANT CHANGE UP (ref 1.6–2.6)
MCHC RBC-ENTMCNC: 25 PG — LOW (ref 27–34)
MCHC RBC-ENTMCNC: 31.5 GM/DL — LOW (ref 32–36)
MCV RBC AUTO: 79.5 FL — LOW (ref 80–100)
PHOSPHATE SERPL-MCNC: 4.1 MG/DL — SIGNIFICANT CHANGE UP (ref 2.5–4.5)
PLATELET # BLD AUTO: 256 K/UL — SIGNIFICANT CHANGE UP (ref 150–400)
POTASSIUM SERPL-MCNC: 3.9 MMOL/L — SIGNIFICANT CHANGE UP (ref 3.5–5.3)
POTASSIUM SERPL-MCNC: 3.9 MMOL/L — SIGNIFICANT CHANGE UP (ref 3.5–5.3)
POTASSIUM SERPL-SCNC: 3.9 MMOL/L — SIGNIFICANT CHANGE UP (ref 3.5–5.3)
POTASSIUM SERPL-SCNC: 3.9 MMOL/L — SIGNIFICANT CHANGE UP (ref 3.5–5.3)
PROT SERPL-MCNC: 6.9 G/DL — SIGNIFICANT CHANGE UP (ref 6–8.3)
PROTHROM AB SERPL-ACNC: 28.3 SEC — HIGH (ref 10–13.1)
RBC # BLD: 4.24 M/UL — SIGNIFICANT CHANGE UP (ref 3.8–5.2)
RBC # FLD: 18.4 % — HIGH (ref 10.3–14.5)
SODIUM SERPL-SCNC: 135 MMOL/L — SIGNIFICANT CHANGE UP (ref 135–145)
SODIUM SERPL-SCNC: 135 MMOL/L — SIGNIFICANT CHANGE UP (ref 135–145)
SPECIMEN SOURCE: SIGNIFICANT CHANGE UP
WBC # BLD: 15.69 K/UL — HIGH (ref 3.8–10.5)
WBC # FLD AUTO: 15.69 K/UL — HIGH (ref 3.8–10.5)

## 2019-12-13 PROCEDURE — 93010 ELECTROCARDIOGRAM REPORT: CPT

## 2019-12-13 PROCEDURE — 99233 SBSQ HOSP IP/OBS HIGH 50: CPT

## 2019-12-13 PROCEDURE — 99223 1ST HOSP IP/OBS HIGH 75: CPT | Mod: GC

## 2019-12-13 RX ORDER — QUETIAPINE FUMARATE 200 MG/1
25 TABLET, FILM COATED ORAL AT BEDTIME
Refills: 0 | Status: DISCONTINUED | OUTPATIENT
Start: 2019-12-13 | End: 2019-12-18

## 2019-12-13 RX ORDER — METRONIDAZOLE 500 MG
500 TABLET ORAL EVERY 8 HOURS
Refills: 0 | Status: DISCONTINUED | OUTPATIENT
Start: 2019-12-13 | End: 2019-12-18

## 2019-12-13 RX ORDER — QUETIAPINE FUMARATE 200 MG/1
25 TABLET, FILM COATED ORAL EVERY 12 HOURS
Refills: 0 | Status: DISCONTINUED | OUTPATIENT
Start: 2019-12-13 | End: 2019-12-18

## 2019-12-13 RX ORDER — MULTIVIT WITH MIN/MFOLATE/K2 340-15/3 G
1 POWDER (GRAM) ORAL ONCE
Refills: 0 | Status: COMPLETED | OUTPATIENT
Start: 2019-12-13 | End: 2019-12-13

## 2019-12-13 RX ORDER — CEFTRIAXONE 500 MG/1
1000 INJECTION, POWDER, FOR SOLUTION INTRAMUSCULAR; INTRAVENOUS EVERY 24 HOURS
Refills: 0 | Status: DISCONTINUED | OUTPATIENT
Start: 2019-12-13 | End: 2019-12-18

## 2019-12-13 RX ORDER — MUPIROCIN 20 MG/G
1 OINTMENT TOPICAL ONCE
Refills: 0 | Status: COMPLETED | OUTPATIENT
Start: 2019-12-13 | End: 2019-12-13

## 2019-12-13 RX ORDER — LANOLIN ALCOHOL/MO/W.PET/CERES
3 CREAM (GRAM) TOPICAL AT BEDTIME
Refills: 0 | Status: DISCONTINUED | OUTPATIENT
Start: 2019-12-13 | End: 2019-12-18

## 2019-12-13 RX ORDER — WARFARIN SODIUM 2.5 MG/1
1.5 TABLET ORAL ONCE
Refills: 0 | Status: COMPLETED | OUTPATIENT
Start: 2019-12-13 | End: 2019-12-13

## 2019-12-13 RX ORDER — MUPIROCIN 20 MG/G
1 OINTMENT TOPICAL
Refills: 0 | Status: DISCONTINUED | OUTPATIENT
Start: 2019-12-14 | End: 2019-12-18

## 2019-12-13 RX ORDER — MUPIROCIN 20 MG/G
1 OINTMENT TOPICAL
Refills: 0 | Status: DISCONTINUED | OUTPATIENT
Start: 2019-12-13 | End: 2019-12-13

## 2019-12-13 RX ADMIN — Medication 1 BOTTLE: at 14:40

## 2019-12-13 RX ADMIN — HYDROMORPHONE HYDROCHLORIDE 2 MILLIGRAM(S): 2 INJECTION INTRAMUSCULAR; INTRAVENOUS; SUBCUTANEOUS at 05:42

## 2019-12-13 RX ADMIN — WARFARIN SODIUM 1.5 MILLIGRAM(S): 2.5 TABLET ORAL at 21:36

## 2019-12-13 RX ADMIN — Medication 3 MILLIGRAM(S): at 21:36

## 2019-12-13 RX ADMIN — Medication 1 TABLET(S): at 11:44

## 2019-12-13 RX ADMIN — POLYETHYLENE GLYCOL 3350 17 GRAM(S): 17 POWDER, FOR SOLUTION ORAL at 05:41

## 2019-12-13 RX ADMIN — HYDROMORPHONE HYDROCHLORIDE 2 MILLIGRAM(S): 2 INJECTION INTRAMUSCULAR; INTRAVENOUS; SUBCUTANEOUS at 17:54

## 2019-12-13 RX ADMIN — CEFTRIAXONE 100 MILLIGRAM(S): 500 INJECTION, POWDER, FOR SOLUTION INTRAMUSCULAR; INTRAVENOUS at 11:44

## 2019-12-13 RX ADMIN — GABAPENTIN 400 MILLIGRAM(S): 400 CAPSULE ORAL at 17:51

## 2019-12-13 RX ADMIN — HYDROMORPHONE HYDROCHLORIDE 2 MILLIGRAM(S): 2 INJECTION INTRAMUSCULAR; INTRAVENOUS; SUBCUTANEOUS at 06:15

## 2019-12-13 RX ADMIN — MUPIROCIN 1 APPLICATION(S): 20 OINTMENT TOPICAL at 12:04

## 2019-12-13 RX ADMIN — Medication 100 MILLIGRAM(S): at 13:19

## 2019-12-13 RX ADMIN — ISOSORBIDE MONONITRATE 30 MILLIGRAM(S): 60 TABLET, EXTENDED RELEASE ORAL at 11:44

## 2019-12-13 RX ADMIN — SIMVASTATIN 40 MILLIGRAM(S): 20 TABLET, FILM COATED ORAL at 21:36

## 2019-12-13 RX ADMIN — Medication 10 MILLIGRAM(S): at 11:44

## 2019-12-13 RX ADMIN — SENNA PLUS 2 TABLET(S): 8.6 TABLET ORAL at 21:36

## 2019-12-13 RX ADMIN — QUETIAPINE FUMARATE 25 MILLIGRAM(S): 200 TABLET, FILM COATED ORAL at 21:36

## 2019-12-13 RX ADMIN — GABAPENTIN 400 MILLIGRAM(S): 400 CAPSULE ORAL at 05:42

## 2019-12-13 RX ADMIN — Medication 100 MILLIGRAM(S): at 21:36

## 2019-12-13 RX ADMIN — Medication 100 MILLIGRAM(S): at 05:42

## 2019-12-13 RX ADMIN — Medication 150 MICROGRAM(S): at 05:42

## 2019-12-13 RX ADMIN — HYDROMORPHONE HYDROCHLORIDE 2 MILLIGRAM(S): 2 INJECTION INTRAMUSCULAR; INTRAVENOUS; SUBCUTANEOUS at 12:15

## 2019-12-13 RX ADMIN — HYDROMORPHONE HYDROCHLORIDE 2 MILLIGRAM(S): 2 INJECTION INTRAMUSCULAR; INTRAVENOUS; SUBCUTANEOUS at 11:44

## 2019-12-13 RX ADMIN — POLYETHYLENE GLYCOL 3350 17 GRAM(S): 17 POWDER, FOR SOLUTION ORAL at 17:51

## 2019-12-13 RX ADMIN — Medication 120 MILLIGRAM(S): at 05:42

## 2019-12-13 RX ADMIN — HYDROMORPHONE HYDROCHLORIDE 2 MILLIGRAM(S): 2 INJECTION INTRAMUSCULAR; INTRAVENOUS; SUBCUTANEOUS at 18:24

## 2019-12-13 NOTE — DISCHARGE NOTE NURSING/CASE MANAGEMENT/SOCIAL WORK - NSDCPEPTCOWAR_GEN_ALL_CORE
Warfarin/Coumadin - Potential for adverse drug reactions and interactions/Warfarin/Coumadin - Dietary Advice/Warfarin/Coumadin - Follow up monitoring/Warfarin/Coumadin - Compliance

## 2019-12-13 NOTE — CONSULT NOTE ADULT - SUBJECTIVE AND OBJECTIVE BOX
Podiatry pager #: [phone]    Patient is a 84y old  Female who presents with a chief complaint of stercoral colitis, hypokalemia (12 Dec 2019 11:04)      HPI:  84 F PMH Afib on Coumadin, AVR, HTN, HLD, CKD3, hip fx, hypothyroidism, neuropathy with multiple analgesic allergies on dilaudid/gabapentin, chronic constipation, p/w R sided abdominal pain.  +constant pain x few days. Last BM >8 days ago, usually goes once a week at most. Today pt had her HHA give her an enema with some stool output, but this was stopped 2/2 abd pain. [place] cp/sob/f/c/n/v/d/dysuria/cough. Denies hematochezia/melena. Ordinarily takes senna/colace, occasionally lactulose. No travel/sick contacts. Collateral obtained from , in agreement with history. Pt is bed bound and dependent on all ADLs.   also notes increasing forgetfullness and mood swings at home.     Vs: 97.6, 101 --> 73, 119/61, 18, 94% RA.  Labs: Leukocytosis 19.7, chronic stable anemia, INR 2.11 in setting of coumadin, hypoKalemia 2.9, stable ckd3, Lactate 2.6 --> 1.6. CXR prelim KARRIE.  CTAP +stercoral colitis, +pulm edema. In ER pt received IV cipro/flagyl, potassium 40 po + 3 runs IV, IVF prior to medicine team involvement. (11 Dec 2019 23:50)    Pod consulted on 12/13 for b/l heel ulcers.      PAST MEDICAL & SURGICAL HISTORY:  PVD (peripheral vascular disease)  Spinal stenosis of lumbar region  CHF (congestive heart failure)  Afib  HLD (hyperlipidemia)  Neuropathy  Hypothyroid  HTN (hypertension)  History of open heart surgery: biosynthetic aortic 2014  Knee fracture, left  Status post hip replacement: right hip 2006  H/O heart surgery      MEDICATIONS  (STANDING):  diltiazem    milliGRAM(s) Oral daily  gabapentin 400 milliGRAM(s) Oral two times a day  isosorbide   mononitrate ER Tablet (IMDUR) 30 milliGRAM(s) Oral daily  levothyroxine 150 MICROGram(s) Oral daily  metoprolol succinate  milliGRAM(s) Oral daily  metroNIDAZOLE  IVPB 500 milliGRAM(s) IV Intermittent every 8 hours  multivitamin 1 Tablet(s) Oral daily  polyethylene glycol 3350 17 Gram(s) Oral two times a day  senna 2 Tablet(s) Oral at bedtime  simvastatin 40 milliGRAM(s) Oral at bedtime  warfarin 1.5 milliGRAM(s) Oral once    MEDICATIONS  (PRN):  HYDROmorphone   Tablet 2 milliGRAM(s) Oral every 6 hours PRN Severe Pain (7 - 10)      Allergies    amoxicillin (Unknown)  Cymbalta (Unknown)  Lyrica (Unknown)  oxycodone (Unknown)  penicillin (Unknown)  Prozac (Unknown)  Savella (Unknown)    Intolerances        VITALS:    Vital Signs Last 24 Hrs  T(C): 36.8 (13 Dec 2019 04:42), Max: 36.9 (12 Dec 2019 15:04)  T(F): 98.2 (13 Dec 2019 04:42), Max: 98.4 (12 Dec 2019 15:04)  HR: 81 (13 Dec 2019 04:42) (67 - 85)  BP: 132/75 (13 Dec 2019 04:42) (110/59 - 132/75)  BP(mean): --  RR: 18 (13 Dec 2019 04:42) (17 - 19)  SpO2: 94% (13 Dec 2019 04:42) (91% - 99%)    LABS:                          10.6   15.69 )-----------( 256      ( 13 Dec 2019 08:40 )             33.7       12-13    135  |  89<L>  |  43<H>  ----------------------------<  88  3.9   |  33<H>  |  1.86<H>    Ca    9.1      13 Dec 2019 06:13  Phos  4.1     12-13  Mg     2.2     12-13    TPro  6.9  /  Alb  2.7<L>  /  TBili  0.5  /  DBili  x   /  AST  13  /  ALT  10  /  AlkPhos  111  12-13      CAPILLARY BLOOD GLUCOSE          PT/INR - ( 13 Dec 2019 08:22 )   PT: 28.3 sec;   INR: 2.44 ratio         PTT - ( 12 Dec 2019 22:34 )  PTT:40.4 sec    LOWER EXTREMITY PHYSICAL EXAM:    Vasular: DP/PT non palpable, B/L, CFT delayed seconds B/L, Temperature gradient WNL B/L.   Neuro: Epicritic sensation diminished to the level of heel B/L.  Musculoskeletal/Ortho: increased edema and venous dermatological changes to b/l LE  Skin:    Wound #1:   Location: Right posterior heel  Size: 4x3cm  Depth: 0.2cm to subQ  Wound bed: granular  Drainage: minimal serous  Odor: none  Periwound: normal  Etiology: pressure    Wound #2:   Location: Left posterior heel  Size: 3x2cm  Depth: to subQ  Wound bed: granular with fibrous plug  Drainage: minimal serous  Odor: none  Periwound: normal  Etiology: Pressure    Wound #3:   Location: Left submet 5  Size: 0.75x0.75cm  Depth: to epidermis  Wound bed:hyperkeratotic  Drainage:none  Odor: none  Periwound: normal  Etiology: Pressure    RADIOLOGY & ADDITIONAL STUDIES:

## 2019-12-13 NOTE — DIETITIAN INITIAL EVALUATION ADULT. - PERTINENT MEDS FT
MEDICATIONS  (STANDING):  bisacodyl Suppository 10 milliGRAM(s) Rectal once  cefTRIAXone   IVPB 1000 milliGRAM(s) IV Intermittent every 24 hours  diltiazem    milliGRAM(s) Oral daily  gabapentin 400 milliGRAM(s) Oral two times a day  isosorbide   mononitrate ER Tablet (IMDUR) 30 milliGRAM(s) Oral daily  levothyroxine 150 MICROGram(s) Oral daily  magnesium citrate Oral Solution 1 Bottle Oral once  metoprolol succinate  milliGRAM(s) Oral daily  metroNIDAZOLE  IVPB 500 milliGRAM(s) IV Intermittent every 8 hours  multivitamin 1 Tablet(s) Oral daily  polyethylene glycol 3350 17 Gram(s) Oral two times a day  senna 2 Tablet(s) Oral at bedtime  simvastatin 40 milliGRAM(s) Oral at bedtime  warfarin 1.5 milliGRAM(s) Oral once    MEDICATIONS  (PRN):  HYDROmorphone   Tablet 2 milliGRAM(s) Oral every 6 hours PRN Severe Pain (7 - 10)

## 2019-12-13 NOTE — BEHAVIORAL HEALTH ASSESSMENT NOTE - HPI (INCLUDE ILLNESS QUALITY, SEVERITY, DURATION, TIMING, CONTEXT, MODIFYING FACTORS, ASSOCIATED SIGNS AND SYMPTOMS)
84 y old  female, , without children, domiciled with , with a 24/7 HHA, retired , with no formal past psychiatric h/o, no prior inpatient/outpatient psychiatric treatments, no known h/o substance abuse, PMH of Afib on Coumadin, AVR, HTN, HLD, CKD3, hip fx, hypothyroidism, neuropathy with multiple analgesic allergies on dilaudid / gabapentin, chronic constipation, who presented with right sided abdominal pain.   Psychiatry is consulted for evaluation of depression and anger.   Upon evaluation, patient appears irritable, moaning due to pain with her eyes closed mostly during evaluation. She states, "I don't pay attention to date or month", when being assessed for orientation and was found oriented to place, person and partially to time (did not know month and was a little off on date believing it's 15th or 16th of Jan or feb). She reports feeling "not good" because, "I have so much pain and I am constipated".  When asked about any memory problems recently, patient states, "It was good until you started asking me all these questions". She also randomly states, 'I am not stupid". When asked about sleep, patient states, "It's not good because of pain". When asked about depressive symptoms, patient states, "what do you mean, I don't want to kill myself". She states that she has nothing much to do except for being in pain all the time, which has been going on for a while. She reports having no hobbies because of "pain". She denies any perceptual disturbances and homicidal ideation.     As per patient's , who was present bedside, patient at baseline had some poor frustration tolerance, however he has noticed worsening for past 1 year. He states that patient gets provoked by little things and starts screaming and yelling, however denies any episodes of physical aggression by patient. As per , patient is dependant on a 24/7 HHA for ADL's.  also mentions some memory lapse recently. He confirms that patient does not have a previous psychiatric h/o and has only seen psychiatrist one time while she was in a rehab about 6 months ago for similar symptoms, however she was not recommended any medication treatment. He also denies any h/o substance abuse by patient in the past. Denies previous suicidal attempts by patient.

## 2019-12-13 NOTE — BEHAVIORAL HEALTH ASSESSMENT NOTE - RISK ASSESSMENT
Low Acute Suicide Risk Chronic risk : Chronic pain, medical comorbidities  Protective factors: No current/past SI, no past psychiatric h/o, no prior SA, no current/past substance a abuse, 24/7 HHA, supportive

## 2019-12-13 NOTE — BEHAVIORAL HEALTH ASSESSMENT NOTE - SUMMARY
84 y old  female, , without children, domiciled with , with a 24/7 HHA, retired , with no formal past psychiatric h/o, no prior inpatient/outpatient psychiatric treatments, no known h/o substance abuse, PMH of Afib on Coumadin, AVR, HTN, HLD, CKD3, hip fx, hypothyroidism, neuropathy with multiple analgesic allergies on dilaudid / gabapentin, chronic constipation, who presented with right sided abdominal pain.   Psychiatry is consulted for evaluation of depression and anger.   Patient appears to have worsening ongoing irritability for past year and as per  otherwise appears close to baseline.  also mentions some memory lapse noticed recently. Patient's symptoms could be in context of underlying dementia?, We recommend dementia work up including repeating TSH, Seroquel 25 mg po bedtime for better mood stability. Please obtain an EKG and make sure QTc is not >500 ms before starting seroquel. We also recommend optimization of pain control and treatment of constipation which appears causing worsening of symptoms and if remains under control, may predispose to delirium, given patient's age and other medical comorbidities,

## 2019-12-13 NOTE — BEHAVIORAL HEALTH ASSESSMENT NOTE - NSBHCHARTREVIEWVS_PSY_A_CORE FT
Vital Signs Last 24 Hrs  T(C): 36.8 (13 Dec 2019 04:42), Max: 36.9 (12 Dec 2019 15:04)  T(F): 98.2 (13 Dec 2019 04:42), Max: 98.4 (12 Dec 2019 15:04)  HR: 81 (13 Dec 2019 04:42) (67 - 85)  BP: 132/75 (13 Dec 2019 04:42) (110/59 - 132/75)  BP(mean): --  RR: 18 (13 Dec 2019 04:42) (18 - 19)  SpO2: 94% (13 Dec 2019 04:42) (91% - 99%)

## 2019-12-13 NOTE — DIETITIAN INITIAL EVALUATION ADULT. - OTHER INFO
Visited pt at bedside with  present. Pt reports having a good appetite; consumes >75% of most meals. Pt observed to be eating food brought in by . Pt denies any known food allergies or intolerances. Pt noted with chronic constipation; last BM reported >8 days PTA. Patient reports that constipation does note effect appetite. Pt denies any chewing/swallowing difficulty, self-feeding difficulty, nausea/vomiting, or diarrhea. Pt reports a recent UBW of ~230 lbs; consistent with dosing weight of 229lbs. She denies any micronutrient supplementation.     Education: Reviewed [facility] "Taking Warfarin Safely" booklet. Discussed interaction between vitamin K and Coumadin, discussed point system and keeping vitamin K intake consistent, pt and  receptive to written handout. Encouraged balanced meals with adequate protein for wound healing. Pt amenable to Ramón (80 calories, 14 grams amino acids) 2x daily to promote wound healing. RD provided high-fiber diet education to promote gastric motility, specifically choosing fresh fruits, vegetables, nuts, beans and seeds, RD provided examples of foods to choose and foods to choose. Pt receptive to education and accepted written materials. Encouraged pt to have adequate fluid intake due to chronic constipation. Pt amenable to prune juice at lunch and dinner.

## 2019-12-13 NOTE — BEHAVIORAL HEALTH ASSESSMENT NOTE - CASE SUMMARY
This is an 84-year-old CF pt, , without children, domiciled with , with a 24/7 HHA, retired , with no formal past psychiatric h/o, no prior inpatient/outpatient psychiatric treatments, no known h/o substance abuse, PMH of Afib on Coumadin, AVR, HTN, HLD, CKD3, hip fx, hypothyroidism, neuropathy with multiple analgesic allergies on dilaudid / gabapentin, chronic constipation, who presented with right sided abdominal pain. Psychiatry is consulted for evaluation of depression and anger.    I have seen and evaluated this patient myself. Chart, labs, meds reviewed. I agree with fellow's assessment and plan.

## 2019-12-13 NOTE — PROGRESS NOTE ADULT - PROBLEM SELECTOR PLAN 4
Podiatry recs appreciated.  XR and CRP pending.  YSABEL and arterial duplex pending.  C/w abx as above however per podiatry, unlikely to be infected.

## 2019-12-13 NOTE — DIETITIAN INITIAL EVALUATION ADULT. - PHYSICAL APPEARANCE
other (specify)/well nourished/obese/Nutrition focused physical exam conducted with verbal consent from patient. Patient observed with no overt signs of protein/calorie malnutrition. Ht: 64in, Dosing Wt: 229lbs, BMI: 39.5kg/m2, IBW: 120lbs +/- 10%, %IBW: 191%  Edema: 1+ generalized, 3+ b/l legs   Skin per nursing flowsheets: stage 3 pressure ulcer (b/l heel), stage 1 pressure ulcer (sacrum), unstageable pressure ulcer (left malleolus).

## 2019-12-13 NOTE — CHART NOTE - NSCHARTNOTEFT_GEN_A_CORE
Wound Care Team Note:    Request for wound care consult for ankle wounds received and referred to wound care team podiatrists, Grayson Rivera and Faye. Will defer to them for management of these wounds.    Tayla Zuñiga, SEFERINO-c, [facility] [id number]

## 2019-12-13 NOTE — DIETITIAN INITIAL EVALUATION ADULT. - REASON INDICATOR FOR ASSESSMENT
Pt seen for nutrition consult. Information obtained from Comprehensive chart review, patient, and .   Chart reviewed, events noted. Pertinent chart information: 84 F PMH Afib on Coumadin, AVR, HTN, HLD, CKD3, hip fx, hypothyroidism, neuropathy with multiple analgesic allergies on dilaudid/gabapentin, chronic constipation, p/w R sided abdominal pain, f/w leukocytosis in setting of stercoral colitis and hypokalemia on admission labs.  Last BM >8 days ago, usually goes once a week at most.

## 2019-12-13 NOTE — CONSULT NOTE ADULT - ASSESSMENT
83 yo female patient w/ b/l heel decubitus ulcers to subQ  - Pt seen and evaluated  - VSS, leukocytosis unlikely from foot  - B/L Heel pressure ulcers to subQ without any probing or acute signs of infection & Left foot submet 5 ulcer to epidermis, stable without signs of infection  - Rx Mupirocin ointment  - Ordered Xrays b/l feet to r/o osteo/gas  - Pt to be in off loading z flow at all time  - Pod plan to c/w local wound care  - Discussed w/ attending 83 yo female patient w/ b/l heel decubitus ulcers to subQ  - Pt seen and evaluated  - VSS, leukocytosis unlikely from foot  - B/L Heel pressure ulcers to subQ without any probing or acute signs of infection & Left foot submet 5 ulcer to epidermis, stable without signs of infection  - Rx Mupirocin ointment  - Ordered Xrays b/l feet to r/o osteo/gas  - Pt to be in off loading z flow at all time  - Ordered YSABEL/PVR  - Pod plan to c/w local wound care  - Discussed w/ attending

## 2019-12-13 NOTE — DISCHARGE NOTE NURSING/CASE MANAGEMENT/SOCIAL WORK - PATIENT PORTAL LINK FT
You can access the FollowMyHealth Patient Portal offered by [facility] by registering at the following website: [email or web address]. By joining [facility]’s FollowMyHealth portal, you will also be able to view your health information using other applications (apps) compatible with our system.

## 2019-12-13 NOTE — PROGRESS NOTE ADULT - PROBLEM SELECTOR PLAN 3
Likely prerenal from infection vs hypovolemia.  S/p IVF. Encourage PO intake.  Avoiding additional ivf to avoid volume overload.   Check bladder scan to r/o postrenal cause however patient without s/s of this.

## 2019-12-13 NOTE — BEHAVIORAL HEALTH ASSESSMENT NOTE - NSBHCONSULTRECOMMENDOTHER_PSY_A_CORE FT
Please get a baseline EKG and make sure Qtc is <500 ms , before starting Seroquel  Optimization of pain control/constipation tx

## 2019-12-13 NOTE — DIETITIAN INITIAL EVALUATION ADULT. - ADD RECOMMEND
1) Continue current diet as tolerated. 2) Recommend Ramón (80 calories, 14 grams amino acids) 2x daily to promote wound healing - spoke to provider. 3) Encourage adequate fluid and fiber intake. 4) Reinforce diet education as needed. 5) Recommend Prune juice at each meal to promote gastric motility. 6) Monitor PO intake, diet tolerance, weight trends, labs, and skin integrity.

## 2019-12-13 NOTE — BEHAVIORAL HEALTH ASSESSMENT NOTE - NSBHCHARTREVIEWLAB_PSY_A_CORE FT
10.6   15.69 )-----------( 256      ( 13 Dec 2019 08:40 )             33.7     12-    135  |  89<L>  |  43<H>  ----------------------------<  88  3.9   |  33<H>  |  1.86<H>    Ca    9.1      13 Dec 2019 06:13  Phos  4.1     12-13  Mg     2.2     -    TPro  6.9  /  Alb  2.7<L>  /  TBili  0.5  /  DBili  x   /  AST  13  /  ALT  10  /  AlkPhos  111  12-13    Urinalysis Basic - ( 11 Dec 2019 23:14 )    Color: Light Yellow / Appearance: Slightly Turbid / S.014 / pH: x  Gluc: x / Ketone: Negative  / Bili: Negative / Urobili: Negative   Blood: x / Protein: Negative / Nitrite: Negative   Leuk Esterase: Large / RBC: 2 /hpf /  /HPF   Sq Epi: x / Non Sq Epi: 0 / Bacteria: Few

## 2019-12-13 NOTE — BEHAVIORAL HEALTH ASSESSMENT NOTE - NSBHCHARTREVIEWINVESTIGATE_PSY_A_CORE FT
Ventricular Rate 69 BPM    Atrial Rate 396 BPM    QRS Duration 88 ms     ms    QTc 490 ms    R Axis 56 degrees    T Axis 88 degrees    Diagnosis Line ATRIAL FLUTTER WITH VARIABLE A-V BLOCK  PROLONGED QT  ABNORMAL ECG

## 2019-12-13 NOTE — PROGRESS NOTE ADULT - SUBJECTIVE AND OBJECTIVE BOX
HOSPITALIST NOTE    Dr. Nasir Lee DO  Attending Physician  Division of Hospital Medicine  [facility]  Pager:  [phone]    SUBJECTIVE  Patient reports that she has not had a BM yet. She is passing gas and has no abdominal pain.  No nausea or vomiting.  No fever or chills.    REVIEW OF SYSTEMS  CONSTITUTIONAL: No fevers. No chills  EYES/ENT: No visual changes. No discharge from eyes. No vertigo. No throat pain. No dysphagia.  NECK: No pain or stiffness or rigidity.  RESPIRATORY: No cough. No wheezing. No hemoptysis. No shortness of breath.  CARDIOVASCULAR: No chest pain. No palpitations.   GASTROINTESTINAL: No abdominal pain. No nausea. No vomiting. No hematemesis. No diarrhea. No constipation. No melena, No hematochezia.  GENITOURINARY: No dysuria. No hesitancy. No frequency. No hematuria.  NEUROLOGICAL: No numbness. No weakness. No change in speech. No fecal or urinary incontinence.   MSK: No joint swelling or erythema. No back pain.  SKIN: No itching or rashes.   PSYCH: Normal affect. Normal mood. No si. No hi.    Review of systems negative except for items noted above.      PAST MEDICAL & SURGICAL HISTORY:  PVD (peripheral vascular disease)  Spinal stenosis of lumbar region  CHF (congestive heart failure)  Afib  HLD (hyperlipidemia)  Neuropathy  Hypothyroid  HTN (hypertension)  History of open heart surgery: biosynthetic aortic 2014  Knee fracture, left  Status post hip replacement: right hip   H/O heart surgery      MEDICATIONS  (STANDING):  bisacodyl Suppository 10 milliGRAM(s) Rectal once  cefTRIAXone   IVPB 1000 milliGRAM(s) IV Intermittent every 24 hours  diltiazem    milliGRAM(s) Oral daily  gabapentin 400 milliGRAM(s) Oral two times a day  isosorbide   mononitrate ER Tablet (IMDUR) 30 milliGRAM(s) Oral daily  levothyroxine 150 MICROGram(s) Oral daily  magnesium citrate Oral Solution 1 Bottle Oral once  metoprolol succinate  milliGRAM(s) Oral daily  metroNIDAZOLE  IVPB 500 milliGRAM(s) IV Intermittent every 8 hours  multivitamin 1 Tablet(s) Oral daily  polyethylene glycol 3350 17 Gram(s) Oral two times a day  senna 2 Tablet(s) Oral at bedtime  simvastatin 40 milliGRAM(s) Oral at bedtime  warfarin 1.5 milliGRAM(s) Oral once    MEDICATIONS  (PRN):  HYDROmorphone   Tablet 2 milliGRAM(s) Oral every 6 hours PRN Severe Pain (7 - 10)      Allergies    amoxicillin (Unknown)  Cymbalta (Unknown)  Lyrica (Unknown)  oxycodone (Unknown)  penicillin (Unknown)  Prozac (Unknown)  Savella (Unknown)    Intolerances        T(C): 36.8 (19 @ 04:42), Max: 36.9 (19 @ 15:04)  T(F): 98.2 (19 @ 04:42), Max: 98.4 (19 @ 15:04)  HR: 81 (19 @ 04:42) (67 - 85)  BP: 132/75 (19 @ 04:42) (110/59 - 132/75)  ABP: --  ABP(mean): --  RR: 18 (19 @ 04:42) (18 - 19)  SpO2: 94% (19 @ 04:42) (91% - 99%)      CONSTITUTIONAL: No acute distress.   HEENT:  Conjunctiva clear B/L. Nasal mucosa normal. Moist oral mucosa. No posterior pharyngeal lesions noted.  Cardiovascular: RRR with no murmurs. No JVD noted. No lower extremity edema B/L. Extremities are warm and well perfused. Radial pulses 2+ B/L. Dorsalis pedis pulses 2+ B/L. Capillary refill <2s  Respiratory: Lungs CTAB. No accessory muscle use.   Gastrointestinal:  Soft, nontender. Mildly distended. Non-rigid. No CVA tenderness B/L.  MSK:  No joint swelling. No joint erythema B/L. No midline spinal tenderness.  Neurologic:  Alert and awake. Oriented x3. Moving all extremities. Following commands. Making eye contact. No focal deficits.  Skin:  No rashes noted. No skin erythema noted.   Psych:  Normal affect. Normal Mood.     LABS                        10.6   15.69 )-----------( 256      ( 13 Dec 2019 08:40 )             33.7     12-    135  |  89<L>  |  43<H>  ----------------------------<  88  3.9   |  33<H>  |  1.86<H>    Ca    9.1      13 Dec 2019 06:13  Phos  4.1     12-  Mg     2.2     12-    TPro  6.9  /  Alb  2.7<L>  /  TBili  0.5  /  DBili  x   /  AST  13  /  ALT  10  /  AlkPhos  111  12-    PT/INR - ( 13 Dec 2019 08:22 )   PT: 28.3 sec;   INR: 2.44 ratio         PTT - ( 12 Dec 2019 22:34 )  PTT:40.4 sec  Urinalysis Basic - ( 11 Dec 2019 23:14 )    Color: Light Yellow / Appearance: Slightly Turbid / S.014 / pH: x  Gluc: x / Ketone: Negative  / Bili: Negative / Urobili: Negative   Blood: x / Protein: Negative / Nitrite: Negative   Leuk Esterase: Large / RBC: 2 /hpf /  /HPF   Sq Epi: x / Non Sq Epi: 0 / Bacteria: Few        ADDITIONAL STUDIES PERSONALLY REVIEWED

## 2019-12-13 NOTE — BEHAVIORAL HEALTH ASSESSMENT NOTE - NSBHCONSULTMEDSEVERE_PSY_A_CORE FT
Serqouel 12.5 mg po q 12 hr prn severe acute agitation Haldol 1-2mg i.v. q6H PRN severe agitation (monitor QTc on EKG)

## 2019-12-13 NOTE — BEHAVIORAL HEALTH ASSESSMENT NOTE - AXIS III
Afib on Coumadin, AVR, HTN, HLD, CKD3, hip fx, hypothyroidism, neuropathy with multiple analgesic allergies on dilaudid / gabapentin, chronic constipation,

## 2019-12-13 NOTE — BEHAVIORAL HEALTH ASSESSMENT NOTE - NSBHCHARTREVIEWIMAGING_PSY_A_CORE FT
EXAM:  BRAIN CT WITHOUT CONTRAST                        PROCEDURE DATE:  May 21 2015   INTERPRETATION:  CLINICAL INFORMATION: Frequent falls    TECHNIQUE: Noncontrast axial CT scan of the head and cervical spine was   performed on 5/21/2015with thin section axial images and sagittal and   coronal reformations.  COMPARISON: No similar prior studies available for comparison.  HEAD:  There is no evidence for acute infarct, calvarial fracture, acute   hemorrhage, mass effect, or hydrocephalus.There are patchy areas of low   attenuation in the bihemispheric white matter without associated mass   effect, nonspecific, but most likely sequela of chronic microvascular   change. A tiny dilated right carotid space is within the right basal   ganglia.    Mild age-appropriate cerebral volume loss is present with proportional   sulcal and ventricular prominence. There is no midline shift or abnormal   extra-axial fluid collection.  The visualized portions of the paranasal sinuses andmastoid air cells   are clear. A calcified sebaceous cyst is notable within the parietal   scalp vertex. The orbits appear unremarkable.  CERVICAL SPINE:  There is no evidence for acute fracture, subluxation, or prevertebral   widening. The craniocervical junction is unremarkable. There is   straightening of the normal cervical lordosis.    Vertebral body height and alignment is maintained. The intervertebral   disc spaces are preserved. There are multilevel degenerative changes   characterized by disc osteophyte complexes and facet and uncinate   hypertrophy. This results in mild multilevel foraminal narrowing. There   is no cervical spinal canal stenosis.    The lung apices are clear. The surrounding soft tissues are unremarkable.   Sternotomy wires are notable.    IMPRESSION: .  Head CT: No evidence for intracranial hemorrhage, mass effect, or   displaced calvarial fracture. Mild chronic white matter microvascular   disease.  Cervical spine CT: No evidence for acute displaced fracture or traumatic   malalignment. Cervical degenerative spondylosis, as described above.  GIOVANNI HIGGINS M.D., RADIOLOGY RESIDENT  PARTH STEIN M.D., ATTENDING RADIOLOGIST

## 2019-12-13 NOTE — PROGRESS NOTE ADULT - PROBLEM SELECTOR PLAN 1
Stercoral colitis with large stool burden on CTAP  Cont senna, Miralax daily.    Passing gas. Giving mag citrate and dulcolax suppository  Bcx obtained however got abx prior.   On ctx and flagyl.

## 2019-12-14 ENCOUNTER — TRANSCRIPTION ENCOUNTER (OUTPATIENT)
Age: 84
End: 2019-12-14

## 2019-12-14 LAB
ALBUMIN SERPL ELPH-MCNC: 2.4 G/DL — LOW (ref 3.3–5)
ALP SERPL-CCNC: 110 U/L — SIGNIFICANT CHANGE UP (ref 40–120)
ALT FLD-CCNC: 10 U/L — SIGNIFICANT CHANGE UP (ref 10–45)
ANION GAP SERPL CALC-SCNC: 15 MMOL/L — SIGNIFICANT CHANGE UP (ref 5–17)
APPEARANCE UR: ABNORMAL
AST SERPL-CCNC: 15 U/L — SIGNIFICANT CHANGE UP (ref 10–40)
BACTERIA # UR AUTO: ABNORMAL
BASOPHILS # BLD AUTO: 0.09 K/UL — SIGNIFICANT CHANGE UP (ref 0–0.2)
BASOPHILS NFR BLD AUTO: 0.7 % — SIGNIFICANT CHANGE UP (ref 0–2)
BILIRUB SERPL-MCNC: 0.4 MG/DL — SIGNIFICANT CHANGE UP (ref 0.2–1.2)
BILIRUB UR-MCNC: NEGATIVE — SIGNIFICANT CHANGE UP
BUN SERPL-MCNC: 51 MG/DL — HIGH (ref 7–23)
CALCIUM SERPL-MCNC: 9.3 MG/DL — SIGNIFICANT CHANGE UP (ref 8.4–10.5)
CHLORIDE SERPL-SCNC: 87 MMOL/L — LOW (ref 96–108)
CO2 SERPL-SCNC: 29 MMOL/L — SIGNIFICANT CHANGE UP (ref 22–31)
COLOR SPEC: YELLOW — SIGNIFICANT CHANGE UP
CREAT ?TM UR-MCNC: 82 MG/DL — SIGNIFICANT CHANGE UP
CREAT SERPL-MCNC: 2.34 MG/DL — HIGH (ref 0.5–1.3)
CRP SERPL-MCNC: 14.48 MG/DL — HIGH (ref 0–0.4)
DIFF PNL FLD: ABNORMAL
EOSINOPHIL # BLD AUTO: 0.41 K/UL — SIGNIFICANT CHANGE UP (ref 0–0.5)
EOSINOPHIL NFR BLD AUTO: 3.4 % — SIGNIFICANT CHANGE UP (ref 0–6)
EPI CELLS # UR: 1 /HPF — SIGNIFICANT CHANGE UP (ref 0–5)
ERYTHROCYTE [SEDIMENTATION RATE] IN BLOOD: 120 MM/HR — HIGH (ref 0–20)
GLUCOSE SERPL-MCNC: 93 MG/DL — SIGNIFICANT CHANGE UP (ref 70–99)
GLUCOSE UR QL: NEGATIVE — SIGNIFICANT CHANGE UP
HCT VFR BLD CALC: 33.5 % — LOW (ref 34.5–45)
HGB BLD-MCNC: 10.3 G/DL — LOW (ref 11.5–15.5)
HYALINE CASTS # UR AUTO: 2 /LPF — SIGNIFICANT CHANGE UP (ref 0–7)
IMM GRANULOCYTES NFR BLD AUTO: 0.5 % — SIGNIFICANT CHANGE UP (ref 0–1.5)
INR BLD: 2.45 RATIO — HIGH (ref 0.88–1.16)
KETONES UR-MCNC: NEGATIVE — SIGNIFICANT CHANGE UP
LEUKOCYTE ESTERASE UR-ACNC: ABNORMAL
LYMPHOCYTES # BLD AUTO: 0.83 K/UL — LOW (ref 1–3.3)
LYMPHOCYTES # BLD AUTO: 6.9 % — LOW (ref 13–44)
MAGNESIUM SERPL-MCNC: 2.6 MG/DL — SIGNIFICANT CHANGE UP (ref 1.6–2.6)
MCHC RBC-ENTMCNC: 24.6 PG — LOW (ref 27–34)
MCHC RBC-ENTMCNC: 30.7 GM/DL — LOW (ref 32–36)
MCV RBC AUTO: 80.1 FL — SIGNIFICANT CHANGE UP (ref 80–100)
MONOCYTES # BLD AUTO: 0.73 K/UL — SIGNIFICANT CHANGE UP (ref 0–0.9)
MONOCYTES NFR BLD AUTO: 6 % — SIGNIFICANT CHANGE UP (ref 2–14)
NEUTROPHILS # BLD AUTO: 9.96 K/UL — HIGH (ref 1.8–7.4)
NEUTROPHILS NFR BLD AUTO: 82.5 % — HIGH (ref 43–77)
NITRITE UR-MCNC: NEGATIVE — SIGNIFICANT CHANGE UP
OSMOLALITY UR: 367 MOSM/KG — SIGNIFICANT CHANGE UP (ref 50–1200)
PH UR: 6 — SIGNIFICANT CHANGE UP (ref 5–8)
PLATELET # BLD AUTO: 254 K/UL — SIGNIFICANT CHANGE UP (ref 150–400)
POTASSIUM SERPL-MCNC: 4.3 MMOL/L — SIGNIFICANT CHANGE UP (ref 3.5–5.3)
POTASSIUM SERPL-SCNC: 4.3 MMOL/L — SIGNIFICANT CHANGE UP (ref 3.5–5.3)
PROT ?TM UR-MCNC: 38 MG/DL — HIGH (ref 0–12)
PROT SERPL-MCNC: 6.5 G/DL — SIGNIFICANT CHANGE UP (ref 6–8.3)
PROT UR-MCNC: ABNORMAL
PROT/CREAT UR-RTO: 0.5 RATIO — HIGH (ref 0–0.2)
PROTHROM AB SERPL-ACNC: 28.7 SEC — HIGH (ref 10–13.1)
RBC # BLD: 4.18 M/UL — SIGNIFICANT CHANGE UP (ref 3.8–5.2)
RBC # FLD: 18.6 % — HIGH (ref 10.3–14.5)
RBC CASTS # UR COMP ASSIST: 5 /HPF — HIGH (ref 0–4)
SODIUM SERPL-SCNC: 131 MMOL/L — LOW (ref 135–145)
SODIUM UR-SCNC: <35 MMOL/L — SIGNIFICANT CHANGE UP
SP GR SPEC: 1.02 — SIGNIFICANT CHANGE UP (ref 1.01–1.02)
UROBILINOGEN FLD QL: SIGNIFICANT CHANGE UP
UUN UR-MCNC: 416 MG/DL — SIGNIFICANT CHANGE UP
WBC # BLD: 12.08 K/UL — HIGH (ref 3.8–10.5)
WBC # FLD AUTO: 12.08 K/UL — HIGH (ref 3.8–10.5)
WBC UR QL: 380 /HPF — HIGH (ref 0–5)

## 2019-12-14 PROCEDURE — 74018 RADEX ABDOMEN 1 VIEW: CPT | Mod: 26

## 2019-12-14 PROCEDURE — 99233 SBSQ HOSP IP/OBS HIGH 50: CPT

## 2019-12-14 PROCEDURE — 73620 X-RAY EXAM OF FOOT: CPT | Mod: 26,50

## 2019-12-14 RX ORDER — SODIUM CHLORIDE 9 MG/ML
500 INJECTION INTRAMUSCULAR; INTRAVENOUS; SUBCUTANEOUS ONCE
Refills: 0 | Status: COMPLETED | OUTPATIENT
Start: 2019-12-14 | End: 2019-12-14

## 2019-12-14 RX ORDER — WARFARIN SODIUM 2.5 MG/1
1.5 TABLET ORAL ONCE
Refills: 0 | Status: COMPLETED | OUTPATIENT
Start: 2019-12-14 | End: 2019-12-14

## 2019-12-14 RX ORDER — LACTULOSE 10 G/15ML
200 SOLUTION ORAL ONCE
Refills: 0 | Status: COMPLETED | OUTPATIENT
Start: 2019-12-14 | End: 2019-12-14

## 2019-12-14 RX ORDER — MULTIVIT WITH MIN/MFOLATE/K2 340-15/3 G
1 POWDER (GRAM) ORAL ONCE
Refills: 0 | Status: COMPLETED | OUTPATIENT
Start: 2019-12-14 | End: 2019-12-14

## 2019-12-14 RX ORDER — LACTULOSE 10 G/15ML
200 SOLUTION ORAL ONCE
Refills: 0 | Status: DISCONTINUED | OUTPATIENT
Start: 2019-12-14 | End: 2019-12-18

## 2019-12-14 RX ORDER — LACTULOSE 10 G/15ML
200 SOLUTION ORAL ONCE
Refills: 0 | Status: DISCONTINUED | OUTPATIENT
Start: 2019-12-14 | End: 2019-12-14

## 2019-12-14 RX ADMIN — MUPIROCIN 1 APPLICATION(S): 20 OINTMENT TOPICAL at 12:02

## 2019-12-14 RX ADMIN — Medication 100 MILLIGRAM(S): at 15:56

## 2019-12-14 RX ADMIN — SENNA PLUS 2 TABLET(S): 8.6 TABLET ORAL at 21:19

## 2019-12-14 RX ADMIN — Medication 1 TABLET(S): at 12:02

## 2019-12-14 RX ADMIN — HYDROMORPHONE HYDROCHLORIDE 2 MILLIGRAM(S): 2 INJECTION INTRAMUSCULAR; INTRAVENOUS; SUBCUTANEOUS at 18:27

## 2019-12-14 RX ADMIN — Medication 150 MICROGRAM(S): at 06:26

## 2019-12-14 RX ADMIN — Medication 100 MILLIGRAM(S): at 21:19

## 2019-12-14 RX ADMIN — HYDROMORPHONE HYDROCHLORIDE 2 MILLIGRAM(S): 2 INJECTION INTRAMUSCULAR; INTRAVENOUS; SUBCUTANEOUS at 06:26

## 2019-12-14 RX ADMIN — SODIUM CHLORIDE 2000 MILLILITER(S): 9 INJECTION INTRAMUSCULAR; INTRAVENOUS; SUBCUTANEOUS at 14:11

## 2019-12-14 RX ADMIN — Medication 100 MILLIGRAM(S): at 06:26

## 2019-12-14 RX ADMIN — CEFTRIAXONE 100 MILLIGRAM(S): 500 INJECTION, POWDER, FOR SOLUTION INTRAMUSCULAR; INTRAVENOUS at 12:06

## 2019-12-14 RX ADMIN — LACTULOSE 200 GRAM(S): 10 SOLUTION ORAL at 16:17

## 2019-12-14 RX ADMIN — QUETIAPINE FUMARATE 25 MILLIGRAM(S): 200 TABLET, FILM COATED ORAL at 21:19

## 2019-12-14 RX ADMIN — HYDROMORPHONE HYDROCHLORIDE 2 MILLIGRAM(S): 2 INJECTION INTRAMUSCULAR; INTRAVENOUS; SUBCUTANEOUS at 06:59

## 2019-12-14 RX ADMIN — Medication 120 MILLIGRAM(S): at 06:26

## 2019-12-14 RX ADMIN — SODIUM CHLORIDE 1000 MILLILITER(S): 9 INJECTION INTRAMUSCULAR; INTRAVENOUS; SUBCUTANEOUS at 09:39

## 2019-12-14 RX ADMIN — GABAPENTIN 400 MILLIGRAM(S): 400 CAPSULE ORAL at 06:26

## 2019-12-14 RX ADMIN — Medication 3 MILLIGRAM(S): at 21:19

## 2019-12-14 RX ADMIN — POLYETHYLENE GLYCOL 3350 17 GRAM(S): 17 POWDER, FOR SOLUTION ORAL at 06:26

## 2019-12-14 RX ADMIN — GABAPENTIN 400 MILLIGRAM(S): 400 CAPSULE ORAL at 18:23

## 2019-12-14 RX ADMIN — POLYETHYLENE GLYCOL 3350 17 GRAM(S): 17 POWDER, FOR SOLUTION ORAL at 18:22

## 2019-12-14 RX ADMIN — WARFARIN SODIUM 1.5 MILLIGRAM(S): 2.5 TABLET ORAL at 21:18

## 2019-12-14 RX ADMIN — SIMVASTATIN 40 MILLIGRAM(S): 20 TABLET, FILM COATED ORAL at 21:19

## 2019-12-14 RX ADMIN — ISOSORBIDE MONONITRATE 30 MILLIGRAM(S): 60 TABLET, EXTENDED RELEASE ORAL at 12:02

## 2019-12-14 RX ADMIN — HYDROMORPHONE HYDROCHLORIDE 2 MILLIGRAM(S): 2 INJECTION INTRAMUSCULAR; INTRAVENOUS; SUBCUTANEOUS at 18:57

## 2019-12-14 NOTE — PROGRESS NOTE ADULT - PROBLEM SELECTOR PLAN 3
Likely prerenal from infection vs hypovolemia.  No signs of post renal etiology. Voiding without issue. No bladder fullness.  Giving 500 cc NS x2.   Monitor BMP.   Checking renal US.

## 2019-12-14 NOTE — DISCHARGE NOTE PROVIDER - NSDCFUADDINST_GEN_ALL_CORE_FT
Podiatry Discharge Instructions:  Follow up: Please follow up with Dr. Mobley within 1 week of discharge from the hospital, please call [phone] for appointment and discuss that you recently were seen in the hospital.  Wound Care: Please apply mupirocin to the bilateral foot wounds, then dress with 4x4 gauze, ugo daily   Weight bearing: Please weight bear as tolerated in a surgical shoe.  Antibiotics: Please continue as instructed.

## 2019-12-14 NOTE — DISCHARGE NOTE PROVIDER - NSDCFUADDAPPT_GEN_ALL_CORE_FT
Please follow up with Vascular Surgery, Dr Menezes 1-2 weeks after discharge to schedule out patient lower leg angiogram.  Please follow up with your primary care provider, Dr Caban 1-2 days after discharge for INR check.

## 2019-12-14 NOTE — PROGRESS NOTE ADULT - PROBLEM SELECTOR PLAN 8
Blood work / Lab work / Tumor markers every 3 mos.  Get lab work drawn at least 1 month prior to appointment. --- due today, February 2019 and May 2019    Scans:  Gallium 68, CT Abd/Pelvis and MRI liver in 3 months after surgery ---    Call Scheduling Department at [phone] to schedule scans prior to next appointment:      Return clinic appointment in 3 months after surgery with Dr. Ndiaye --- patient will call to schedule    Echo every year - due November 2019    Alternate EUS and EGD: rotate every 6 months --- EGD due in May 2019    Consult: will see Dr. Salvador today regarding a surgical consult    Consult: will see the nutritionist today in regards to some nutritional education    Consult: will send a referral to cardiology to have them call you to schedule an appointment in regards to     Consult: will send a referral to pulmonary (Dr Lyla Staples) and have them to call you to schedule an appointment in regards to the respiratory episodes during the EUS   Transitions of Care Status:  1.  Name of PCP: Nasir Caban  2.  PCP Contacted on Admission: [ ] Y    [x ] N    3.  PCP contacted at Discharge: [ ] Y    [ ] N    [ ] N/A  4.  Post-Discharge Appointment Date and Location:  5.  Summary of Handoff given to PCP:

## 2019-12-14 NOTE — DISCHARGE NOTE PROVIDER - NSDCCPCAREPLAN_GEN_ALL_CORE_FT
PRINCIPAL DISCHARGE DIAGNOSIS  Diagnosis: Colitis  Assessment and Plan of Treatment:       SECONDARY DISCHARGE DIAGNOSES  Diagnosis: JERRY (acute kidney injury)  Assessment and Plan of Treatment: JERRY (acute kidney injury)    Diagnosis: Heel ulcer  Assessment and Plan of Treatment: Heel ulcer PRINCIPAL DISCHARGE DIAGNOSIS  Diagnosis: Colitis  Assessment and Plan of Treatment: HOME CARE INSTRUCTIONS  Get plenty of rest.  Drink enough water and fluids to keep your urine clear or pale yellow.  Eat a well-balanced diet.  Call your caregiver for follow-up as recommended.  SEEK IMMEDIATE MEDICAL CARE IF:  You develop chills.  You have extreme weakness, fainting, or dehydration.  You have repeated vomiting.  You develop severe belly (abdominal) pain or are passing bloody or tarry stools      SECONDARY DISCHARGE DIAGNOSES  Diagnosis: JERRY (acute kidney injury)  Assessment and Plan of Treatment: Improved  Follow up with PMD for repeat blood work to monitor kidney function    Diagnosis: Heel ulcer  Assessment and Plan of Treatment: Wound care: please apply bactroban, adaptic and allevyn pad to bilateral heel ulcerations and Left submet 5 ulceration - apply daily  Follow up with Vascular surgery as outpatient for further workup    Diagnosis: PAF (paroxysmal atrial fibrillation)  Assessment and Plan of Treatment: Follow up with PMD for INR check in 2-3 days  Atrial fibrillation is the most common heart rhythm problem.  The condition puts you at risk for has stroke and heart attack  It helps if you control your blood pressure, not drink more than 1-2 alcohol drinks per day, cut down on caffeine, getting treatment for over active thyroid gland, and get regular exercise  Call your doctor if you feel your heart racing or beating unusually, chest tightness or pain, lightheaded, faint, shortness of breath especially with exercise  It is important to take your heart medication as prescribed  You may be on anticoagulation which is very important to take as directed - you may need blood work to monitor drug levels PRINCIPAL DISCHARGE DIAGNOSIS  Diagnosis: Colitis  Assessment and Plan of Treatment: HOME CARE INSTRUCTIONS  Get plenty of rest.  Drink enough water and fluids to keep your urine clear or pale yellow.  Eat a well-balanced diet.  Call your caregiver for follow-up as recommended.  SEEK IMMEDIATE MEDICAL CARE IF:  You develop chills.  You have extreme weakness, fainting, or dehydration.  You have repeated vomiting.  You develop severe belly (abdominal) pain or are passing bloody or tarry stools      SECONDARY DISCHARGE DIAGNOSES  Diagnosis: PAF (paroxysmal atrial fibrillation)  Assessment and Plan of Treatment: Follow up with PMD for INR check in 2-3 days  Atrial fibrillation is the most common heart rhythm problem.  The condition puts you at risk for has stroke and heart attack  It helps if you control your blood pressure, not drink more than 1-2 alcohol drinks per day, cut down on caffeine, getting treatment for over active thyroid gland, and get regular exercise  Call your doctor if you feel your heart racing or beating unusually, chest tightness or pain, lightheaded, faint, shortness of breath especially with exercise  It is important to take your heart medication as prescribed  You may be on anticoagulation which is very important to take as directed - you may need blood work to monitor drug levels    Diagnosis: JERRY (acute kidney injury)  Assessment and Plan of Treatment: Improved  Follow up with PMD for repeat blood work to monitor kidney function    Diagnosis: Chronic systolic heart failure  Assessment and Plan of Treatment: Chronic systolic heart failure    Diagnosis: Obesity (BMI 30-39.9)  Assessment and Plan of Treatment: Obesity (BMI 30-39.9)    Diagnosis: Heel ulcer  Assessment and Plan of Treatment: Wound care: please apply bactroban, adaptic and allevyn pad to bilateral heel ulcerations and Left submet 5 ulceration - apply daily  Follow up with Vascular surgery as outpatient for further workup

## 2019-12-14 NOTE — DISCHARGE NOTE PROVIDER - HOSPITAL COURSE
84 F PMH Afib on Coumadin, AVR, HTN, HLD, CKD stage 3, hip fx, hypothyroidism, neuropathy with multiple analgesic allergies on dilaudid/gabapentin, chronic constipation, p/w R sided abdominal pain and leukocytosis in setting of stercoral colitis and hypokalemia.         Stercoral colitis with large stool burden on CTAP. Aggressive bowel regimen. Multiple BMs. Cont     On ctx and flagyl and switched to PO on DC to complete 10 days.     Improving.         Hypokalemia from poor po intake and colitis. Improved.         JERRY Likely prerenal from infection vs hypovolemia- improving    No signs of post renal etiology. Voiding without issue. No bladder fullness.    Bladder scan with 202 cc post void.         Heel ulcer. Seen by Podiatry     YSABEL and arterial duplex pt not able to tolerate    C/w abx as above however per podiatry, unlikely to be infected.    Dilauidid 2mg PO Q6hrs pRN pain    Gabapentin 400mg PO BID    Vascular surgery consulted, pt to follow up outpatient for LE angiogram         Adjustment disorder with disturbance of conduct. Plan: No behavioral disturbance now continue    Seroquel 25mg PO QHS        PAF. Cont Diltiazem 120mg PO daily. Coumadin 1mg. 84 F PMH Afib on Coumadin, AVR, HTN, HLD, CKD stage 3, hip fx, hypothyroidism, neuropathy with multiple analgesic allergies on dilaudid/gabapentin, chronic constipation, p/w R sided abdominal pain and leukocytosis in setting of stercoral colitis and hypokalemia.         Stercoral colitis with large stool burden on CTAP. Aggressive bowel regimen. Multiple BMs. Cont     On ctx and flagyl and switched to PO on DC to complete 10 days.     Improving.         Hypokalemia from poor po intake and colitis. Improved.         JERRY Likely prerenal from infection vs hypovolemia- improving    No signs of post renal etiology. Voiding without issue. No bladder fullness.    Bladder scan with 202 cc post void.         Heel ulcer. Seen by Podiatry     YSABEL and arterial duplex pt not able to tolerate    C/w abx as above however per podiatry, unlikely to be infected.    Dilauidid 2mg PO Q6hrs pRN pain    Gabapentin 400mg PO BID    Vascular surgery consulted, pt to follow up outpatient for LE angiogram         Adjustment disorder with disturbance of conduct. Plan: No behavioral disturbance now continue    Seroquel 25mg PO QHS        PAF. Cont Diltiazem 120mg PO daily. Coumadin 1mg. with INR checks and adjustment with home care

## 2019-12-14 NOTE — PROGRESS NOTE ADULT - SUBJECTIVE AND OBJECTIVE BOX
HOSPITALIST NOTE    Dr. Nasir Lee DO  Attending Physician  Division of Hospital Medicine  [facility]  Pager:  [phone]    SUBJECTIVE  Reporting some RLQ pain.  She still reports constipation however she is passing gas.     REVIEW OF SYSTEMS  CONSTITUTIONAL: No fevers. No chills  EYES/ENT: No visual changes. No discharge from eyes. No vertigo. No throat pain. No dysphagia.  NECK: No pain or stiffness or rigidity.  RESPIRATORY: No cough. No wheezing. No hemoptysis. No shortness of breath.  CARDIOVASCULAR: No chest pain. No palpitations.   GASTROINTESTINAL: +abdominal pain. No nausea. No vomiting. No hematemesis. No diarrhea. +constipation. No melena, No hematochezia.  GENITOURINARY: No dysuria. No hesitancy. No frequency. No hematuria.  NEUROLOGICAL: No numbness. No weakness. No change in speech. No fecal or urinary incontinence.   MSK: No joint swelling or erythema. No back pain.  SKIN: No itching or rashes.   PSYCH: Normal affect. Normal mood. No si. No hi.    Review of systems negative except for items noted above.      PAST MEDICAL & SURGICAL HISTORY:  PVD (peripheral vascular disease)  Spinal stenosis of lumbar region  CHF (congestive heart failure)  Afib  HLD (hyperlipidemia)  Neuropathy  Hypothyroid  HTN (hypertension)  History of open heart surgery: biosynthetic aortic 2014  Knee fracture, left  Status post hip replacement: right hip   H/O heart surgery      MEDICATIONS  (STANDING):  cefTRIAXone   IVPB 1000 milliGRAM(s) IV Intermittent every 24 hours  diltiazem    milliGRAM(s) Oral daily  gabapentin 400 milliGRAM(s) Oral two times a day  isosorbide   mononitrate ER Tablet (IMDUR) 30 milliGRAM(s) Oral daily  lactulose Retention Enema 200 Gram(s) Rectal once  levothyroxine 150 MICROGram(s) Oral daily  melatonin 3 milliGRAM(s) Oral at bedtime  metoprolol succinate  milliGRAM(s) Oral daily  metroNIDAZOLE  IVPB 500 milliGRAM(s) IV Intermittent every 8 hours  multivitamin 1 Tablet(s) Oral daily  mupirocin 2% Ointment 1 Application(s) Topical <User Schedule>  polyethylene glycol 3350 17 Gram(s) Oral two times a day  QUEtiapine 25 milliGRAM(s) Oral at bedtime  senna 2 Tablet(s) Oral at bedtime  simvastatin 40 milliGRAM(s) Oral at bedtime  warfarin 1.5 milliGRAM(s) Oral once    MEDICATIONS  (PRN):  HYDROmorphone   Tablet 2 milliGRAM(s) Oral every 6 hours PRN Severe Pain (7 - 10)  QUEtiapine 25 milliGRAM(s) Oral every 12 hours PRN agitation      Allergies    amoxicillin (Unknown)  Cymbalta (Unknown)  Lyrica (Unknown)  oxycodone (Unknown)  penicillin (Unknown)  Prozac (Unknown)  Savella (Unknown)    Intolerances        T(C): 36.6 (19 @ 12:41), Max: 36.6 (19 @ 21:26)  T(F): 97.8 (19 @ 12:41), Max: 97.8 (19 @ 21:26)  HR: 64 (19 @ 12:41) (64 - 84)  BP: 96/59 (19 @ 12:41) (96/59 - 107/73)  ABP: --  ABP(mean): --  RR: 18 (19 @ 12:41) (18 - 19)  SpO2: 97% (19 @ 12:41) (94% - 97%)      CONSTITUTIONAL: No acute distress.   HEENT:  Conjunctiva clear B/L. Nasal mucosa normal. Moist oral mucosa. No posterior pharyngeal lesions noted.  Cardiovascular: RRR with no murmurs. No JVD noted. No lower extremity edema B/L. Extremities are warm and well perfused. Radial pulses 2+ B/L. Dorsalis pedis pulses 2+ B/L. Capillary refill <2s  Respiratory: Lungs CTAB. No accessory muscle use.   Gastrointestinal:  Soft. Mild tenderness located in the RLQ to deep palpation. Non-distended. Non-rigid. No CVA tenderness B/L.  MSK:  No joint swelling. No joint erythema B/L. No midline spinal tenderness.  Neurologic:  Alert and awake. Oriented x3. Moving all extremities. Following commands. Making eye contact. No focal deficits.  Skin:  No rashes noted. No skin erythema noted.   Psych:  Normal affect. Normal Mood.     LABS                        10.3   12.08 )-----------( 254      ( 14 Dec 2019 11:12 )             33.5     12-14    131<L>  |  87<L>  |  51<H>  ----------------------------<  93  4.3   |  29  |  2.34<H>    Ca    9.3      14 Dec 2019 06:18  Phos  4.1     12-13  Mg     2.6     -    TPro  6.5  /  Alb  2.4<L>  /  TBili  0.4  /  DBili  x   /  AST  15  /  ALT  10  /  AlkPhos  110  12-14    PT/INR - ( 14 Dec 2019 08:39 )   PT: 28.7 sec;   INR: 2.45 ratio         PTT - ( 12 Dec 2019 22:34 )  PTT:40.4 sec  Urinalysis Basic - ( 14 Dec 2019 12:43 )    Color: Yellow / Appearance: Slightly Turbid / S.024 / pH: x  Gluc: x / Ketone: Negative  / Bili: Negative / Urobili: <2 mg/dL   Blood: x / Protein: 30 mg/dL / Nitrite: Negative   Leuk Esterase: Large / RBC: 5 /HPF /  /HPF   Sq Epi: x / Non Sq Epi: 1 /HPF / Bacteria: Many

## 2019-12-14 NOTE — DISCHARGE NOTE PROVIDER - CARE PROVIDER_API CALL
Jonny Menezes)  Vascular Surgery  [address] [facility], [address]  [place] [address]  Phone: [phone]  Fax: [phone]  Follow Up Time:     Nasir Caban)  Geriatric Medicine; Internal Medicine  [address] [place] [address]  Phone: [phone]  Fax: [phone]  Follow Up Time:

## 2019-12-14 NOTE — PROGRESS NOTE ADULT - PROBLEM SELECTOR PLAN 1
Stercoral colitis with large stool burden on CTAP  Cont senna, Miralax daily.    Passing gas. However still no BM  Giving lactulose retention enema and getting AXR to evaluate bowel gas pattern.  May need to given more magnesium citrate however will wait for AXR.   Bcx obtained however got abx prior.   On ctx and flagyl.

## 2019-12-14 NOTE — DISCHARGE NOTE PROVIDER - NSDCMRMEDTOKEN_GEN_ALL_CORE_FT
Coumadin 1 mg oral tablet: 1.5 tab(s) orally once a day (at bedtime)  Dilaudid 2 mg oral tablet: 1 tab(s) orally every 6 hours, As Needed  diltiazem 120 mg/24 hours oral capsule, extended release: 1 cap(s) orally once a day  docusate sodium 100 mg oral capsule: 1 cap(s) orally 3 times a day  gabapentin 400 mg oral capsule: 1 cap(s) orally 2 times a day  isosorbide mononitrate 30 mg oral tablet, extended release: 1 tab(s) orally once a day (in the morning)  Lasix 40 mg oral tablet: 1 tab(s) orally once a day  levothyroxine 150 mcg (0.15 mg) oral tablet: 1 tab(s) orally once a day  metOLazone 2.5 mg oral tablet: 1 tab(s) orally once a day  Multiple Vitamins oral tablet: 1 tab(s) orally once a day  polyethylene glycol 3350 oral powder for reconstitution: 17 gram(s) orally once a day  senna oral tablet: 2 tab(s) orally once a day (at bedtime), As needed, Constipation  simvastatin 40 mg oral tablet: 1 tab(s) orally once a day (at bedtime)  Toprol- mg oral tablet, extended release: 1 tab(s) orally once a day bisacodyl 10 mg rectal suppository: 1 suppository(ies) rectal once, As needed, Constipation  Coumadin 1 mg oral tablet: 1.5 tab(s) orally once a day (at bedtime)  Dilaudid 2 mg oral tablet: 1 tab(s) orally every 6 hours, As Needed  diltiazem 120 mg/24 hours oral capsule, extended release: 1 cap(s) orally once a day  docusate sodium 100 mg oral capsule: 1 cap(s) orally 3 times a day  gabapentin 400 mg oral capsule: 1 cap(s) orally 2 times a day  isosorbide mononitrate 30 mg oral tablet, extended release: 1 tab(s) orally once a day (in the morning)  Lasix 40 mg oral tablet: 1 tab(s) orally once a day  levothyroxine 150 mcg (0.15 mg) oral tablet: 1 tab(s) orally once a day  metOLazone 2.5 mg oral tablet: 1 tab(s) orally once a day  Multiple Vitamins oral tablet: 1 tab(s) orally once a day  polyethylene glycol 3350 oral powder for reconstitution: 17 gram(s) orally once a day  QUEtiapine 25 mg oral tablet: 1 tab(s) orally every 12 hours, As needed, agitation  senna oral tablet: 2 tab(s) orally once a day (at bedtime), As needed, Constipation  simvastatin 40 mg oral tablet: 1 tab(s) orally once a day (at bedtime)  Toprol- mg oral tablet, extended release: 1 tab(s) orally once a day bisacodyl 10 mg rectal suppository: 1 suppository(ies) rectal once, As needed, Constipation  Coumadin 1 mg oral tablet: 1 tab(s) orally once a day (at bedtime)  Dilaudid 2 mg oral tablet: 1 tab(s) orally every 6 hours, As Needed  diltiazem 120 mg/24 hours oral capsule, extended release: 1 cap(s) orally once a day  docusate sodium 100 mg oral capsule: 1 cap(s) orally 3 times a day  gabapentin 400 mg oral capsule: 1 cap(s) orally 2 times a day  isosorbide mononitrate 30 mg oral tablet, extended release: 1 tab(s) orally once a day (in the morning)  Lasix 40 mg oral tablet: 1 tab(s) orally once a day  levothyroxine 150 mcg (0.15 mg) oral tablet: 1 tab(s) orally once a day  metOLazone 2.5 mg oral tablet: 1 tab(s) orally once a day  Multiple Vitamins oral tablet: 1 tab(s) orally once a day  polyethylene glycol 3350 oral powder for reconstitution: 17 gram(s) orally once a day  senna oral tablet: 2 tab(s) orally once a day (at bedtime), As needed, Constipation  simvastatin 40 mg oral tablet: 1 tab(s) orally once a day (at bedtime)  Toprol- mg oral tablet, extended release: 1 tab(s) orally once a day bisacodyl 10 mg rectal suppository: 1 suppository(ies) rectal once, As needed, Constipation  ciprofloxacin 500 mg oral tablet: 1 tab(s) orally every 12 hours  Coumadin 1 mg oral tablet: 1 tab(s) orally once a day (at bedtime)  Dilaudid 2 mg oral tablet: 1 tab(s) orally every 6 hours, As Needed  diltiazem 120 mg/24 hours oral capsule, extended release: 1 cap(s) orally once a day  docusate sodium 100 mg oral capsule: 1 cap(s) orally 3 times a day  gabapentin 400 mg oral capsule: 1 cap(s) orally 2 times a day  isosorbide mononitrate 30 mg oral tablet, extended release: 1 tab(s) orally once a day (in the morning)  Lasix 40 mg oral tablet: 1 tab(s) orally once a day  levothyroxine 150 mcg (0.15 mg) oral tablet: 1 tab(s) orally once a day  metOLazone 2.5 mg oral tablet: 1 tab(s) orally once a day  metroNIDAZOLE 500 mg oral tablet: 1 tab(s) orally every 8 hours  Multiple Vitamins oral tablet: 1 tab(s) orally once a day  polyethylene glycol 3350 oral powder for reconstitution: 17 gram(s) orally 2 times a day  QUEtiapine 25 mg oral tablet: 1 tab(s) orally once a day (at bedtime)  senna oral tablet: 2 tab(s) orally once a day (at bedtime), As needed, Constipation  simvastatin 40 mg oral tablet: 1 tab(s) orally once a day (at bedtime)  Toprol- mg oral tablet, extended release: 1 tab(s) orally once a day bisacodyl 10 mg rectal suppository: 1 suppository(ies) rectal once, As needed, Constipation  ciprofloxacin 500 mg oral tablet: 1 tab(s) orally every 12 hours  Coumadin 1 mg oral tablet: 1 tab(s) orally once a day (at bedtime)  Dilaudid 2 mg oral tablet: 1 tab(s) orally every 6 hours, As Needed  diltiazem 120 mg/24 hours oral capsule, extended release: 1 cap(s) orally once a day  docusate sodium 100 mg oral capsule: 1 cap(s) orally 3 times a day  gabapentin 400 mg oral capsule: 1 cap(s) orally 2 times a day  Ray lift: TREE 99  R 53.1  [id number]  Wt 104kg  isosorbide mononitrate 30 mg oral tablet, extended release: 1 tab(s) orally once a day (in the morning)  Lasix 40 mg oral tablet: 1 tab(s) orally once a day  levothyroxine 150 mcg (0.15 mg) oral tablet: 1 tab(s) orally once a day  metOLazone 2.5 mg oral tablet: 1 tab(s) orally once a day  metroNIDAZOLE 500 mg oral tablet: 1 tab(s) orally every 8 hours  Multiple Vitamins oral tablet: 1 tab(s) orally once a day  polyethylene glycol 3350 oral powder for reconstitution: 17 gram(s) orally 2 times a day  QUEtiapine 25 mg oral tablet: 1 tab(s) orally once a day (at bedtime)  senna oral tablet: 2 tab(s) orally once a day (at bedtime), As needed, Constipation  simvastatin 40 mg oral tablet: 1 tab(s) orally once a day (at bedtime)  Toprol- mg oral tablet, extended release: 1 tab(s) orally once a day bisacodyl 10 mg rectal suppository: 1 suppository(ies) rectal once, As needed, Constipation  ciprofloxacin 500 mg oral tablet: 1 tab(s) orally every 12 hours  Coumadin 1 mg oral tablet: 1 tab(s) orally once a day (at bedtime)  Dilaudid 2 mg oral tablet: 1 tab(s) orally every 6 hours, As Needed  diltiazem 120 mg/24 hours oral capsule, extended release: 1 cap(s) orally once a day  docusate sodium 100 mg oral capsule: 1 cap(s) orally 3 times a day  gabapentin 400 mg oral capsule: 1 cap(s) orally 2 times a day  Ray lift: TREE 99  R 53.1  [id number]  Wt 104kg  isosorbide mononitrate 30 mg oral tablet, extended release: 1 tab(s) orally once a day (in the morning)  Lasix 40 mg oral tablet: 1 tab(s) orally once a day  levothyroxine 150 mcg (0.15 mg) oral tablet: 1 tab(s) orally once a day  metOLazone 2.5 mg oral tablet: 1 tab(s) orally once a day  metroNIDAZOLE 500 mg oral tablet: 1 tab(s) orally every 8 hours  Multiple Vitamins oral tablet: 1 tab(s) orally once a day  polyethylene glycol 3350 oral powder for reconstitution: 17 gram(s) orally 2 times a day  QUEtiapine 25 mg oral tablet: 1 tab(s) orally once a day (at bedtime)  senna oral tablet: 2 tab(s) orally once a day (at bedtime), As needed, Constipation  simvastatin 40 mg oral tablet: 1 tab(s) orally once a day (at bedtime)  Toprol- mg oral tablet, extended release: 1 tab(s) orally once a day

## 2019-12-15 LAB
ALBUMIN SERPL ELPH-MCNC: 2.4 G/DL — LOW (ref 3.3–5)
ALP SERPL-CCNC: 109 U/L — SIGNIFICANT CHANGE UP (ref 40–120)
ALT FLD-CCNC: 7 U/L — LOW (ref 10–45)
ANION GAP SERPL CALC-SCNC: 13 MMOL/L — SIGNIFICANT CHANGE UP (ref 5–17)
APTT BLD: 44.4 SEC — HIGH (ref 27.5–36.3)
AST SERPL-CCNC: 15 U/L — SIGNIFICANT CHANGE UP (ref 10–40)
BASOPHILS # BLD AUTO: 0 K/UL — SIGNIFICANT CHANGE UP (ref 0–0.2)
BASOPHILS NFR BLD AUTO: 0 % — SIGNIFICANT CHANGE UP (ref 0–2)
BILIRUB SERPL-MCNC: 0.3 MG/DL — SIGNIFICANT CHANGE UP (ref 0.2–1.2)
BUN SERPL-MCNC: 53 MG/DL — HIGH (ref 7–23)
CALCIUM SERPL-MCNC: 9 MG/DL — SIGNIFICANT CHANGE UP (ref 8.4–10.5)
CHLORIDE SERPL-SCNC: 91 MMOL/L — LOW (ref 96–108)
CO2 SERPL-SCNC: 31 MMOL/L — SIGNIFICANT CHANGE UP (ref 22–31)
CREAT SERPL-MCNC: 2.35 MG/DL — HIGH (ref 0.5–1.3)
EOSINOPHIL # BLD AUTO: 0.58 K/UL — HIGH (ref 0–0.5)
EOSINOPHIL NFR BLD AUTO: 6.1 % — HIGH (ref 0–6)
GLUCOSE SERPL-MCNC: 71 MG/DL — SIGNIFICANT CHANGE UP (ref 70–99)
HCT VFR BLD CALC: 31.4 % — LOW (ref 34.5–45)
HGB BLD-MCNC: 9.7 G/DL — LOW (ref 11.5–15.5)
INR BLD: 2.5 RATIO — HIGH (ref 0.88–1.16)
LYMPHOCYTES # BLD AUTO: 0.41 K/UL — LOW (ref 1–3.3)
LYMPHOCYTES # BLD AUTO: 4.3 % — LOW (ref 13–44)
MANUAL SMEAR VERIFICATION: SIGNIFICANT CHANGE UP
MCHC RBC-ENTMCNC: 24.6 PG — LOW (ref 27–34)
MCHC RBC-ENTMCNC: 30.9 GM/DL — LOW (ref 32–36)
MCV RBC AUTO: 79.7 FL — LOW (ref 80–100)
MONOCYTES # BLD AUTO: 0.41 K/UL — SIGNIFICANT CHANGE UP (ref 0–0.9)
MONOCYTES NFR BLD AUTO: 4.3 % — SIGNIFICANT CHANGE UP (ref 2–14)
NEUTROPHILS # BLD AUTO: 8.04 K/UL — HIGH (ref 1.8–7.4)
NEUTROPHILS NFR BLD AUTO: 83.5 % — HIGH (ref 43–77)
NEUTS BAND # BLD: 0.9 % — SIGNIFICANT CHANGE UP (ref 0–8)
PLAT MORPH BLD: NORMAL — SIGNIFICANT CHANGE UP
PLATELET # BLD AUTO: 248 K/UL — SIGNIFICANT CHANGE UP (ref 150–400)
POTASSIUM SERPL-MCNC: 4.8 MMOL/L — SIGNIFICANT CHANGE UP (ref 3.5–5.3)
POTASSIUM SERPL-SCNC: 4.8 MMOL/L — SIGNIFICANT CHANGE UP (ref 3.5–5.3)
PROT SERPL-MCNC: 6.2 G/DL — SIGNIFICANT CHANGE UP (ref 6–8.3)
PROTHROM AB SERPL-ACNC: 29.6 SEC — HIGH (ref 10–13.1)
RBC # BLD: 3.94 M/UL — SIGNIFICANT CHANGE UP (ref 3.8–5.2)
RBC # FLD: 18.6 % — HIGH (ref 10.3–14.5)
RBC BLD AUTO: NORMAL — SIGNIFICANT CHANGE UP
SODIUM SERPL-SCNC: 135 MMOL/L — SIGNIFICANT CHANGE UP (ref 135–145)
VARIANT LYMPHS # BLD: 0.9 % — SIGNIFICANT CHANGE UP (ref 0–6)
WBC # BLD: 9.53 K/UL — SIGNIFICANT CHANGE UP (ref 3.8–10.5)
WBC # FLD AUTO: 9.53 K/UL — SIGNIFICANT CHANGE UP (ref 3.8–10.5)

## 2019-12-15 PROCEDURE — 99233 SBSQ HOSP IP/OBS HIGH 50: CPT

## 2019-12-15 RX ORDER — WARFARIN SODIUM 2.5 MG/1
1.5 TABLET ORAL ONCE
Refills: 0 | Status: COMPLETED | OUTPATIENT
Start: 2019-12-15 | End: 2019-12-15

## 2019-12-15 RX ORDER — SODIUM CHLORIDE 9 MG/ML
500 INJECTION INTRAMUSCULAR; INTRAVENOUS; SUBCUTANEOUS ONCE
Refills: 0 | Status: COMPLETED | OUTPATIENT
Start: 2019-12-15 | End: 2019-12-15

## 2019-12-15 RX ORDER — MULTIVIT WITH MIN/MFOLATE/K2 340-15/3 G
1 POWDER (GRAM) ORAL ONCE
Refills: 0 | Status: COMPLETED | OUTPATIENT
Start: 2019-12-15 | End: 2019-12-15

## 2019-12-15 RX ADMIN — Medication 100 MILLIGRAM(S): at 21:34

## 2019-12-15 RX ADMIN — SIMVASTATIN 40 MILLIGRAM(S): 20 TABLET, FILM COATED ORAL at 21:33

## 2019-12-15 RX ADMIN — Medication 3 MILLIGRAM(S): at 21:33

## 2019-12-15 RX ADMIN — Medication 100 MILLIGRAM(S): at 05:54

## 2019-12-15 RX ADMIN — QUETIAPINE FUMARATE 25 MILLIGRAM(S): 200 TABLET, FILM COATED ORAL at 21:33

## 2019-12-15 RX ADMIN — SENNA PLUS 2 TABLET(S): 8.6 TABLET ORAL at 21:33

## 2019-12-15 RX ADMIN — MUPIROCIN 1 APPLICATION(S): 20 OINTMENT TOPICAL at 12:09

## 2019-12-15 RX ADMIN — HYDROMORPHONE HYDROCHLORIDE 2 MILLIGRAM(S): 2 INJECTION INTRAMUSCULAR; INTRAVENOUS; SUBCUTANEOUS at 05:54

## 2019-12-15 RX ADMIN — HYDROMORPHONE HYDROCHLORIDE 2 MILLIGRAM(S): 2 INJECTION INTRAMUSCULAR; INTRAVENOUS; SUBCUTANEOUS at 12:19

## 2019-12-15 RX ADMIN — HYDROMORPHONE HYDROCHLORIDE 2 MILLIGRAM(S): 2 INJECTION INTRAMUSCULAR; INTRAVENOUS; SUBCUTANEOUS at 20:51

## 2019-12-15 RX ADMIN — Medication 120 MILLIGRAM(S): at 05:54

## 2019-12-15 RX ADMIN — ISOSORBIDE MONONITRATE 30 MILLIGRAM(S): 60 TABLET, EXTENDED RELEASE ORAL at 12:09

## 2019-12-15 RX ADMIN — HYDROMORPHONE HYDROCHLORIDE 2 MILLIGRAM(S): 2 INJECTION INTRAMUSCULAR; INTRAVENOUS; SUBCUTANEOUS at 12:49

## 2019-12-15 RX ADMIN — GABAPENTIN 400 MILLIGRAM(S): 400 CAPSULE ORAL at 05:54

## 2019-12-15 RX ADMIN — WARFARIN SODIUM 1.5 MILLIGRAM(S): 2.5 TABLET ORAL at 21:33

## 2019-12-15 RX ADMIN — SODIUM CHLORIDE 1000 MILLILITER(S): 9 INJECTION INTRAMUSCULAR; INTRAVENOUS; SUBCUTANEOUS at 10:52

## 2019-12-15 RX ADMIN — CEFTRIAXONE 100 MILLIGRAM(S): 500 INJECTION, POWDER, FOR SOLUTION INTRAMUSCULAR; INTRAVENOUS at 12:09

## 2019-12-15 RX ADMIN — Medication 1 BOTTLE: at 20:51

## 2019-12-15 RX ADMIN — POLYETHYLENE GLYCOL 3350 17 GRAM(S): 17 POWDER, FOR SOLUTION ORAL at 17:48

## 2019-12-15 RX ADMIN — GABAPENTIN 400 MILLIGRAM(S): 400 CAPSULE ORAL at 17:48

## 2019-12-15 RX ADMIN — Medication 150 MICROGRAM(S): at 05:54

## 2019-12-15 RX ADMIN — Medication 100 MILLIGRAM(S): at 13:12

## 2019-12-15 RX ADMIN — POLYETHYLENE GLYCOL 3350 17 GRAM(S): 17 POWDER, FOR SOLUTION ORAL at 05:54

## 2019-12-15 RX ADMIN — HYDROMORPHONE HYDROCHLORIDE 2 MILLIGRAM(S): 2 INJECTION INTRAMUSCULAR; INTRAVENOUS; SUBCUTANEOUS at 20:22

## 2019-12-15 RX ADMIN — Medication 1 TABLET(S): at 12:09

## 2019-12-15 NOTE — PROGRESS NOTE ADULT - PROBLEM SELECTOR PLAN 1
Stercoral colitis with large stool burden on CTAP  Cont senna, Miralax daily.    Had large BM yesterday. Needs BM daily.   Bcx obtained however got abx prior.   On ctx and flagyl.  Improving.

## 2019-12-15 NOTE — PROGRESS NOTE ADULT - SUBJECTIVE AND OBJECTIVE BOX
HOSPITALIST NOTE    Dr. Nasir Lee DO  Attending Physician  Division of Hospital Medicine  [facility]  Pager:  [phone]    SUBJECTIVE  RLQ abdominal pain still present but improved after large BM yesterday.     REVIEW OF SYSTEMS  CONSTITUTIONAL: No fevers. No chills  EYES/ENT: No visual changes. No discharge from eyes. No vertigo. No throat pain. No dysphagia.  NECK: No pain or stiffness or rigidity.  RESPIRATORY: No cough. No wheezing. No hemoptysis. No shortness of breath.  CARDIOVASCULAR: No chest pain. No palpitations.   GASTROINTESTINAL: + abdominal pain. No nausea. No vomiting. No hematemesis. No diarrhea. No constipation. No melena, No hematochezia.  GENITOURINARY: No dysuria. No hesitancy. No frequency. No hematuria.  NEUROLOGICAL: No numbness. No weakness. No change in speech. No fecal or urinary incontinence.   MSK: No joint swelling or erythema. No back pain.  SKIN: No itching or rashes.   PSYCH: Normal affect. Normal mood. No si. No hi.    Review of systems negative except for items noted above.      PAST MEDICAL & SURGICAL HISTORY:  PVD (peripheral vascular disease)  Spinal stenosis of lumbar region  CHF (congestive heart failure)  Afib  HLD (hyperlipidemia)  Neuropathy  Hypothyroid  HTN (hypertension)  History of open heart surgery: biosynthetic aortic 2014  Knee fracture, left  Status post hip replacement: right hip   H/O heart surgery      MEDICATIONS  (STANDING):  cefTRIAXone   IVPB 1000 milliGRAM(s) IV Intermittent every 24 hours  diltiazem    milliGRAM(s) Oral daily  gabapentin 400 milliGRAM(s) Oral two times a day  isosorbide   mononitrate ER Tablet (IMDUR) 30 milliGRAM(s) Oral daily  lactulose Retention Enema 200 Gram(s) Rectal once  levothyroxine 150 MICROGram(s) Oral daily  melatonin 3 milliGRAM(s) Oral at bedtime  metoprolol succinate  milliGRAM(s) Oral daily  metroNIDAZOLE  IVPB 500 milliGRAM(s) IV Intermittent every 8 hours  multivitamin 1 Tablet(s) Oral daily  mupirocin 2% Ointment 1 Application(s) Topical <User Schedule>  polyethylene glycol 3350 17 Gram(s) Oral two times a day  QUEtiapine 25 milliGRAM(s) Oral at bedtime  senna 2 Tablet(s) Oral at bedtime  simvastatin 40 milliGRAM(s) Oral at bedtime  warfarin 1.5 milliGRAM(s) Oral once    MEDICATIONS  (PRN):  bisacodyl Suppository 10 milliGRAM(s) Rectal once PRN Constipation  HYDROmorphone   Tablet 2 milliGRAM(s) Oral every 6 hours PRN Severe Pain (7 - 10)  QUEtiapine 25 milliGRAM(s) Oral every 12 hours PRN agitation      Allergies    amoxicillin (Unknown)  Cymbalta (Unknown)  Lyrica (Unknown)  oxycodone (Unknown)  penicillin (Unknown)  Prozac (Unknown)  Savella (Unknown)    Intolerances        T(C): 36.2 (12-15-19 @ 05:07), Max: 36.9 (19 @ 21:20)  T(F): 97.2 (12-15-19 @ 05:07), Max: 98.5 (19 @ 21:20)  HR: 64 (12-15-19 @ 11:22) (61 - 80)  BP: 124/84 (12-15-19 @ 11:22) (116/58 - 130/64)  ABP: --  ABP(mean): --  RR: 18 (12-15-19 @ 05:07) (18 - 18)  SpO2: 98% (12-15-19 @ 05:07) (98% - 98%)      CONSTITUTIONAL: No acute distress.   HEENT:  Conjunctiva clear B/L. Nasal mucosa normal. Moist oral mucosa. No posterior pharyngeal lesions noted.  Cardiovascular: RRR with no murmurs. No JVD noted. No lower extremity edema B/L. Extremities are warm and well perfused. Radial pulses 2+ B/L. Dorsalis pedis pulses 2+ B/L. Capillary refill <2s  Respiratory: Lungs CTAB. No accessory muscle use.   Gastrointestinal:  Soft. Mild tenderness located in the RLQ to deep palpation. Non-distended. Non-rigid. No CVA tenderness B/L.  MSK:  No joint swelling. No joint erythema B/L. No midline spinal tenderness.  Neurologic:  Alert and awake. Oriented x3. Moving all extremities. Following commands. Making eye contact. No focal deficits.  Skin:  No rashes noted. No skin erythema noted.   Psych:  Normal affect. Normal Mood.      LABS                        9.7    9.53  )-----------( 248      ( 15 Dec 2019 09:44 )             31.4     12-15    135  |  91<L>  |  53<H>  ----------------------------<  71  4.8   |  31  |  2.35<H>    Ca    9.0      15 Dec 2019 07:17  Mg     2.6     12-    TPro  6.2  /  Alb  2.4<L>  /  TBili  0.3  /  DBili  x   /  AST  15  /  ALT  7<L>  /  AlkPhos  109  12-15    PT/INR - ( 15 Dec 2019 09:37 )   PT: 29.6 sec;   INR: 2.50 ratio         PTT - ( 15 Dec 2019 09:37 )  PTT:44.4 sec  Urinalysis Basic - ( 14 Dec 2019 12:43 )    Color: Yellow / Appearance: Slightly Turbid / S.024 / pH: x  Gluc: x / Ketone: Negative  / Bili: Negative / Urobili: <2 mg/dL   Blood: x / Protein: 30 mg/dL / Nitrite: Negative   Leuk Esterase: Large / RBC: 5 /HPF /  /HPF   Sq Epi: x / Non Sq Epi: 1 /HPF / Bacteria: Many        ADDITIONAL STUDIES PERSONALLY REVIEWED

## 2019-12-15 NOTE — PROGRESS NOTE ADULT - PROBLEM SELECTOR PLAN 3
Likely prerenal from infection vs hypovolemia.  No signs of post renal etiology. Voiding without issue. No bladder fullness.  Bladder scan with 202 cc post void.   Monitor BMP.   Giving additional 500 cc of NS today. Will be careful with IVF given history of sCHF. No s/s of volume overload.   Monitor volume status closely.

## 2019-12-15 NOTE — CHART NOTE - NSCHARTNOTEFT_GEN_A_CORE
[facility] Medicine Event Note    Discussed with Dr. Lee patient has had no BM today.  Confirmed with attending to order magnesium citrate (1 bottle) x1 tonight.  Order placed. Monitor for BM.  Endorsed to night team to follow up.    Helena Gaston PA-C  Dept of medicine  #[id number]

## 2019-12-16 LAB
ALBUMIN SERPL ELPH-MCNC: 2.4 G/DL — LOW (ref 3.3–5)
ALP SERPL-CCNC: 113 U/L — SIGNIFICANT CHANGE UP (ref 40–120)
ALT FLD-CCNC: 7 U/L — LOW (ref 10–45)
ANION GAP SERPL CALC-SCNC: 13 MMOL/L — SIGNIFICANT CHANGE UP (ref 5–17)
AST SERPL-CCNC: 14 U/L — SIGNIFICANT CHANGE UP (ref 10–40)
BASOPHILS # BLD AUTO: 0.05 K/UL — SIGNIFICANT CHANGE UP (ref 0–0.2)
BASOPHILS NFR BLD AUTO: 0.5 % — SIGNIFICANT CHANGE UP (ref 0–2)
BILIRUB SERPL-MCNC: 0.2 MG/DL — SIGNIFICANT CHANGE UP (ref 0.2–1.2)
BUN SERPL-MCNC: 47 MG/DL — HIGH (ref 7–23)
CALCIUM SERPL-MCNC: 8.6 MG/DL — SIGNIFICANT CHANGE UP (ref 8.4–10.5)
CHLORIDE SERPL-SCNC: 94 MMOL/L — LOW (ref 96–108)
CO2 SERPL-SCNC: 29 MMOL/L — SIGNIFICANT CHANGE UP (ref 22–31)
CREAT SERPL-MCNC: 1.92 MG/DL — HIGH (ref 0.5–1.3)
EOSINOPHIL # BLD AUTO: 0.46 K/UL — SIGNIFICANT CHANGE UP (ref 0–0.5)
EOSINOPHIL NFR BLD AUTO: 4.7 % — SIGNIFICANT CHANGE UP (ref 0–6)
GLUCOSE SERPL-MCNC: 79 MG/DL — SIGNIFICANT CHANGE UP (ref 70–99)
HCT VFR BLD CALC: 31.1 % — LOW (ref 34.5–45)
HGB BLD-MCNC: 9.5 G/DL — LOW (ref 11.5–15.5)
IMM GRANULOCYTES NFR BLD AUTO: 0.4 % — SIGNIFICANT CHANGE UP (ref 0–1.5)
INR BLD: 3.17 RATIO — HIGH (ref 0.88–1.16)
LYMPHOCYTES # BLD AUTO: 0.53 K/UL — LOW (ref 1–3.3)
LYMPHOCYTES # BLD AUTO: 5.4 % — LOW (ref 13–44)
MCHC RBC-ENTMCNC: 24.4 PG — LOW (ref 27–34)
MCHC RBC-ENTMCNC: 30.5 GM/DL — LOW (ref 32–36)
MCV RBC AUTO: 79.9 FL — LOW (ref 80–100)
MONOCYTES # BLD AUTO: 0.63 K/UL — SIGNIFICANT CHANGE UP (ref 0–0.9)
MONOCYTES NFR BLD AUTO: 6.4 % — SIGNIFICANT CHANGE UP (ref 2–14)
NEUTROPHILS # BLD AUTO: 8.08 K/UL — HIGH (ref 1.8–7.4)
NEUTROPHILS NFR BLD AUTO: 82.6 % — HIGH (ref 43–77)
PLATELET # BLD AUTO: 260 K/UL — SIGNIFICANT CHANGE UP (ref 150–400)
POTASSIUM SERPL-MCNC: 4.2 MMOL/L — SIGNIFICANT CHANGE UP (ref 3.5–5.3)
POTASSIUM SERPL-SCNC: 4.2 MMOL/L — SIGNIFICANT CHANGE UP (ref 3.5–5.3)
PROT SERPL-MCNC: 6.2 G/DL — SIGNIFICANT CHANGE UP (ref 6–8.3)
PROTHROM AB SERPL-ACNC: 37.5 SEC — HIGH (ref 10–13.1)
RBC # BLD: 3.89 M/UL — SIGNIFICANT CHANGE UP (ref 3.8–5.2)
RBC # FLD: 18.6 % — HIGH (ref 10.3–14.5)
SODIUM SERPL-SCNC: 136 MMOL/L — SIGNIFICANT CHANGE UP (ref 135–145)
WBC # BLD: 9.79 K/UL — SIGNIFICANT CHANGE UP (ref 3.8–10.5)
WBC # FLD AUTO: 9.79 K/UL — SIGNIFICANT CHANGE UP (ref 3.8–10.5)

## 2019-12-16 PROCEDURE — 99232 SBSQ HOSP IP/OBS MODERATE 35: CPT

## 2019-12-16 PROCEDURE — 93923 UPR/LXTR ART STDY 3+ LVLS: CPT | Mod: 26

## 2019-12-16 RX ADMIN — QUETIAPINE FUMARATE 25 MILLIGRAM(S): 200 TABLET, FILM COATED ORAL at 21:01

## 2019-12-16 RX ADMIN — GABAPENTIN 400 MILLIGRAM(S): 400 CAPSULE ORAL at 05:01

## 2019-12-16 RX ADMIN — Medication 120 MILLIGRAM(S): at 05:01

## 2019-12-16 RX ADMIN — HYDROMORPHONE HYDROCHLORIDE 2 MILLIGRAM(S): 2 INJECTION INTRAMUSCULAR; INTRAVENOUS; SUBCUTANEOUS at 21:02

## 2019-12-16 RX ADMIN — ISOSORBIDE MONONITRATE 30 MILLIGRAM(S): 60 TABLET, EXTENDED RELEASE ORAL at 11:15

## 2019-12-16 RX ADMIN — HYDROMORPHONE HYDROCHLORIDE 2 MILLIGRAM(S): 2 INJECTION INTRAMUSCULAR; INTRAVENOUS; SUBCUTANEOUS at 05:30

## 2019-12-16 RX ADMIN — Medication 150 MICROGRAM(S): at 05:01

## 2019-12-16 RX ADMIN — GABAPENTIN 400 MILLIGRAM(S): 400 CAPSULE ORAL at 17:56

## 2019-12-16 RX ADMIN — Medication 100 MILLIGRAM(S): at 05:01

## 2019-12-16 RX ADMIN — POLYETHYLENE GLYCOL 3350 17 GRAM(S): 17 POWDER, FOR SOLUTION ORAL at 05:02

## 2019-12-16 RX ADMIN — HYDROMORPHONE HYDROCHLORIDE 2 MILLIGRAM(S): 2 INJECTION INTRAMUSCULAR; INTRAVENOUS; SUBCUTANEOUS at 20:40

## 2019-12-16 RX ADMIN — Medication 100 MILLIGRAM(S): at 14:52

## 2019-12-16 RX ADMIN — CEFTRIAXONE 100 MILLIGRAM(S): 500 INJECTION, POWDER, FOR SOLUTION INTRAMUSCULAR; INTRAVENOUS at 11:14

## 2019-12-16 RX ADMIN — Medication 3 MILLIGRAM(S): at 21:01

## 2019-12-16 RX ADMIN — HYDROMORPHONE HYDROCHLORIDE 2 MILLIGRAM(S): 2 INJECTION INTRAMUSCULAR; INTRAVENOUS; SUBCUTANEOUS at 11:14

## 2019-12-16 RX ADMIN — Medication 1 TABLET(S): at 11:15

## 2019-12-16 RX ADMIN — SENNA PLUS 2 TABLET(S): 8.6 TABLET ORAL at 21:01

## 2019-12-16 RX ADMIN — Medication 100 MILLIGRAM(S): at 21:01

## 2019-12-16 RX ADMIN — POLYETHYLENE GLYCOL 3350 17 GRAM(S): 17 POWDER, FOR SOLUTION ORAL at 17:56

## 2019-12-16 RX ADMIN — SIMVASTATIN 40 MILLIGRAM(S): 20 TABLET, FILM COATED ORAL at 21:01

## 2019-12-16 RX ADMIN — HYDROMORPHONE HYDROCHLORIDE 2 MILLIGRAM(S): 2 INJECTION INTRAMUSCULAR; INTRAVENOUS; SUBCUTANEOUS at 11:44

## 2019-12-16 RX ADMIN — HYDROMORPHONE HYDROCHLORIDE 2 MILLIGRAM(S): 2 INJECTION INTRAMUSCULAR; INTRAVENOUS; SUBCUTANEOUS at 05:03

## 2019-12-16 RX ADMIN — MUPIROCIN 1 APPLICATION(S): 20 OINTMENT TOPICAL at 11:16

## 2019-12-16 NOTE — PROGRESS NOTE ADULT - PROBLEM SELECTOR PLAN 3
Likely prerenal from infection vs hypovolemia- improving  No signs of post renal etiology. Voiding without issue. No bladder fullness.  Bladder scan with 202 cc post void.   Monitor BMP.   No s/s of volume overload.   Monitor volume status closely.

## 2019-12-16 NOTE — PROGRESS NOTE ADULT - SUBJECTIVE AND OBJECTIVE BOX
PROGRESS NOTE:     Patient is a 84y old  Female who presents with a chief complaint of stercoral colitis, hypokalemia (15 Dec 2019 13:40)      SUBJECTIVE / OVERNIGHT EVENTS: Went for dopplers this am    ADDITIONAL REVIEW OF SYSTEMS:  No fevers ro chills  + BM     MEDICATIONS  (STANDING):  cefTRIAXone   IVPB 1000 milliGRAM(s) IV Intermittent every 24 hours  diltiazem    milliGRAM(s) Oral daily  gabapentin 400 milliGRAM(s) Oral two times a day  isosorbide   mononitrate ER Tablet (IMDUR) 30 milliGRAM(s) Oral daily  lactulose Retention Enema 200 Gram(s) Rectal once  levothyroxine 150 MICROGram(s) Oral daily  melatonin 3 milliGRAM(s) Oral at bedtime  metoprolol succinate  milliGRAM(s) Oral daily  metroNIDAZOLE  IVPB 500 milliGRAM(s) IV Intermittent every 8 hours  multivitamin 1 Tablet(s) Oral daily  mupirocin 2% Ointment 1 Application(s) Topical <User Schedule>  polyethylene glycol 3350 17 Gram(s) Oral two times a day  QUEtiapine 25 milliGRAM(s) Oral at bedtime  senna 2 Tablet(s) Oral at bedtime  simvastatin 40 milliGRAM(s) Oral at bedtime    MEDICATIONS  (PRN):  bisacodyl Suppository 10 milliGRAM(s) Rectal once PRN Constipation  HYDROmorphone   Tablet 2 milliGRAM(s) Oral every 6 hours PRN Severe Pain (7 - 10)  QUEtiapine 25 milliGRAM(s) Oral every 12 hours PRN agitation      CAPILLARY BLOOD GLUCOSE        I&O's Summary    15 Dec 2019 07:01  -  16 Dec 2019 07:00  --------------------------------------------------------  IN: 1090 mL / OUT: 1100 mL / NET: -10 mL        PHYSICAL EXAM:  Vital Signs Last 24 Hrs  T(C): 36.6 (16 Dec 2019 14:58), Max: 36.6 (16 Dec 2019 05:25)  T(F): 97.9 (16 Dec 2019 14:58), Max: 97.9 (16 Dec 2019 14:58)  HR: 70 (16 Dec 2019 14:58) (66 - 77)  BP: 133/76 (16 Dec 2019 14:58) (108/50 - 133/76)  BP(mean): --  RR: 18 (16 Dec 2019 14:58) (18 - 18)  SpO2: 91% (16 Dec 2019 14:58) (91% - 92%)    CONSTITUTIONAL: NAD, well-developed  RESPIRATORY: Normal respiratory effort; lungs are clear to auscultation bilaterally  CARDIOVASCULAR: Regular rate and rhythm, normal S1 and S2, no murmur/rub/gallop; No lower extremity edema; Peripheral pulses are 2+ bilaterally  ABDOMEN: Nontender to palpation, normoactive bowel sounds, no rebound/guarding; No hepatosplenomegaly  MUSCLOSKELETAL: no clubbing or cyanosis of digits; no joint swelling or tenderness to palpation  PSYCH: A+O to person, place, and time; affect appropriate    LABS:                        9.5    9.79  )-----------( 260      ( 16 Dec 2019 08:15 )             31.1     12-16    136  |  94<L>  |  47<H>  ----------------------------<  79  4.2   |  29  |  1.92<H>    Ca    8.6      16 Dec 2019 06:48    TPro  6.2  /  Alb  2.4<L>  /  TBili  0.2  /  DBili  x   /  AST  14  /  ALT  7<L>  /  AlkPhos  113  12-16    PT/INR - ( 16 Dec 2019 08:34 )   PT: 37.5 sec;   INR: 3.17 ratio         PTT - ( 15 Dec 2019 09:37 )  PTT:44.4 sec          Culture - Blood (collected 13 Dec 2019 15:19)  Source: .Blood Blood  Preliminary Report (14 Dec 2019 16:01):    No growth to date.        RADIOLOGY & ADDITIONAL TESTS:  Results Reviewed:   Imaging Personally Reviewed:  Electrocardiogram Personally Reviewed:    COORDINATION OF CARE:  Care Discussed with Consultants/Other Providers [Y/N]:  Prior or Outpatient Records Reviewed [Y/N]:

## 2019-12-16 NOTE — PROGRESS NOTE ADULT - PROBLEM SELECTOR PLAN 1
Stercoral colitis with large stool burden on CTAP  Cont senna, Miralax daily.    Had large BM yesterday. Needs BM daily.   Bcx obtained however got abx prior.   On ctx and flagyl and can switch to PO on DC to complete 10 days.   Improving.

## 2019-12-16 NOTE — PROGRESS NOTE ADULT - PROBLEM SELECTOR PLAN 8
Cont Diltiazem 120mg PO daily  Coumadin 1.5 mg qhs with daily INR today Supratherapeutic will hold 1 dose.   Monitor for bleeding. No s/s of this.

## 2019-12-16 NOTE — PROGRESS NOTE ADULT - PROBLEM SELECTOR PLAN 4
Podiatry recs appreciated.  YSABEL and arterial duplex pending.  C/w abx as above however per podiatry, unlikely to be infected.  Dilauidid 2mg PO Q6hrs pRN pain  Gabapentin 400mg PO BID

## 2019-12-16 NOTE — PROGRESS NOTE ADULT - SUBJECTIVE AND OBJECTIVE BOX
84 F PMH Afib on Coumadin, AVR, HTN, HLD, CKD3, hip fx, hypothyroidism, neuropathy with multiple analgesic allergies on dilaudid/gabapentin, chronic constipation, p/w R sided abdominal pain.  Podiatry evaluating for bilateral foot wounds.       PAST MEDICAL & SURGICAL HISTORY:  PVD (peripheral vascular disease)  Spinal stenosis of lumbar region  CHF (congestive heart failure)  Afib  HLD (hyperlipidemia)  Neuropathy  Hypothyroid  HTN (hypertension)  History of open heart surgery: biosynthetic aortic 2014  Knee fracture, left  Status post hip replacement: right hip 2006  H/O heart surgery        Allergies    amoxicillin (Unknown)  Cymbalta (Unknown)  Lyrica (Unknown)  oxycodone (Unknown)  penicillin (Unknown)  Prozac (Unknown)  Savella (Unknown)    Intolerances                   LOWER EXTREMITY PHYSICAL EXAM:    Vasular: DP/PT non palpable, B/L, CFT delayed seconds B/L, Temperature gradient WNL B/L.   Neuro: Epicritic sensation diminished to the level of heel B/L.  Musculoskeletal/Ortho: increased edema and venous dermatological changes to b/l LE  Skin:    Wound #1:   Location: Right posterior heel  Size: 4x3cm  Depth: 0.2cm to subQ  Wound bed: granular  Drainage: minimal serous  Odor: none  Periwound: normal  Etiology: pressure    Wound #2:   Location: Left posterior heel  Size: 3x2cm  Depth: to subQ  Wound bed: granular with fibrous plug  Drainage: minimal serous  Odor: none  Periwound: normal  Etiology: Pressure    Wound #3:   Location: Left submet 5  Size: 0.75x0.75cm  Depth: to epidermis  Wound bed:hyperkeratotic  Drainage:none  Odor: none  Periwound: normal  Etiology: Pressure

## 2019-12-16 NOTE — PROGRESS NOTE ADULT - ASSESSMENT
83 yo female patient w/ b/l heel decubitus ulcers to subQ  - Pt seen and evaluated  - VSS, leukocytosis unlikely from foot  - B/L Heel pressure ulcers to subQ without any probing or acute signs of infection & Left foot submet 5 ulcer to epidermis, stable without signs of infection  - Rx Mupirocin ointment  - Pt to be in off loading z flow at all time  - Pod plan to c/w local wound care 83 yo female patient w/ b/l heel decubitus ulcers to subQ  - Pt seen and evaluated  - VSS, leukocytosis unlikely from foot  - B/L Heel pressure ulcers to subQ without any probing or acute signs of infection & Left foot submet 5 ulcer to epidermis, stable without signs of infection  - Xray of feet: no OM  - YSABEL: incomplete but showing multi-segmental dz. Recommend vascular evaluation   - Rx Mupirocin ointment  - Pt to be in off loading z flow at all time  - Pod plan to c/w local wound care

## 2019-12-17 LAB
ALBUMIN SERPL ELPH-MCNC: 2.5 G/DL — LOW (ref 3.3–5)
ALP SERPL-CCNC: 115 U/L — SIGNIFICANT CHANGE UP (ref 40–120)
ALT FLD-CCNC: 9 U/L — LOW (ref 10–45)
ANION GAP SERPL CALC-SCNC: 13 MMOL/L — SIGNIFICANT CHANGE UP (ref 5–17)
ANION GAP SERPL CALC-SCNC: 8 MMOL/L — SIGNIFICANT CHANGE UP (ref 5–17)
APTT BLD: 54.5 SEC — HIGH (ref 27.5–36.3)
AST SERPL-CCNC: 19 U/L — SIGNIFICANT CHANGE UP (ref 10–40)
BILIRUB SERPL-MCNC: 0.2 MG/DL — SIGNIFICANT CHANGE UP (ref 0.2–1.2)
BUN SERPL-MCNC: 40 MG/DL — HIGH (ref 7–23)
BUN SERPL-MCNC: 42 MG/DL — HIGH (ref 7–23)
CALCIUM SERPL-MCNC: 8.6 MG/DL — SIGNIFICANT CHANGE UP (ref 8.4–10.5)
CALCIUM SERPL-MCNC: 8.8 MG/DL — SIGNIFICANT CHANGE UP (ref 8.4–10.5)
CHLORIDE SERPL-SCNC: 98 MMOL/L — SIGNIFICANT CHANGE UP (ref 96–108)
CHLORIDE SERPL-SCNC: 98 MMOL/L — SIGNIFICANT CHANGE UP (ref 96–108)
CO2 SERPL-SCNC: 29 MMOL/L — SIGNIFICANT CHANGE UP (ref 22–31)
CO2 SERPL-SCNC: 29 MMOL/L — SIGNIFICANT CHANGE UP (ref 22–31)
CREAT SERPL-MCNC: 1.81 MG/DL — HIGH (ref 0.5–1.3)
CREAT SERPL-MCNC: 1.99 MG/DL — HIGH (ref 0.5–1.3)
GLUCOSE SERPL-MCNC: 121 MG/DL — HIGH (ref 70–99)
GLUCOSE SERPL-MCNC: 78 MG/DL — SIGNIFICANT CHANGE UP (ref 70–99)
HCT VFR BLD CALC: 31.4 % — LOW (ref 34.5–45)
HGB BLD-MCNC: 9.6 G/DL — LOW (ref 11.5–15.5)
INR BLD: 3.15 RATIO — HIGH (ref 0.88–1.16)
MCHC RBC-ENTMCNC: 24.6 PG — LOW (ref 27–34)
MCHC RBC-ENTMCNC: 30.6 GM/DL — LOW (ref 32–36)
MCV RBC AUTO: 80.5 FL — SIGNIFICANT CHANGE UP (ref 80–100)
PLATELET # BLD AUTO: 246 K/UL — SIGNIFICANT CHANGE UP (ref 150–400)
POTASSIUM SERPL-MCNC: 4.1 MMOL/L — SIGNIFICANT CHANGE UP (ref 3.5–5.3)
POTASSIUM SERPL-MCNC: 4.5 MMOL/L — SIGNIFICANT CHANGE UP (ref 3.5–5.3)
POTASSIUM SERPL-SCNC: 4.1 MMOL/L — SIGNIFICANT CHANGE UP (ref 3.5–5.3)
POTASSIUM SERPL-SCNC: 4.5 MMOL/L — SIGNIFICANT CHANGE UP (ref 3.5–5.3)
PROT SERPL-MCNC: 6.1 G/DL — SIGNIFICANT CHANGE UP (ref 6–8.3)
PROTHROM AB SERPL-ACNC: 36.9 SEC — HIGH (ref 10–13.1)
RBC # BLD: 3.9 M/UL — SIGNIFICANT CHANGE UP (ref 3.8–5.2)
RBC # FLD: 18.8 % — HIGH (ref 10.3–14.5)
SODIUM SERPL-SCNC: 135 MMOL/L — SIGNIFICANT CHANGE UP (ref 135–145)
SODIUM SERPL-SCNC: 140 MMOL/L — SIGNIFICANT CHANGE UP (ref 135–145)
WBC # BLD: 7.87 K/UL — SIGNIFICANT CHANGE UP (ref 3.8–10.5)
WBC # FLD AUTO: 7.87 K/UL — SIGNIFICANT CHANGE UP (ref 3.8–10.5)

## 2019-12-17 PROCEDURE — 99232 SBSQ HOSP IP/OBS MODERATE 35: CPT

## 2019-12-17 RX ORDER — WARFARIN SODIUM 2.5 MG/1
0.5 TABLET ORAL ONCE
Refills: 0 | Status: COMPLETED | OUTPATIENT
Start: 2019-12-17 | End: 2019-12-17

## 2019-12-17 RX ADMIN — MUPIROCIN 1 APPLICATION(S): 20 OINTMENT TOPICAL at 11:49

## 2019-12-17 RX ADMIN — Medication 100 MILLIGRAM(S): at 13:02

## 2019-12-17 RX ADMIN — Medication 1 TABLET(S): at 11:48

## 2019-12-17 RX ADMIN — HYDROMORPHONE HYDROCHLORIDE 2 MILLIGRAM(S): 2 INJECTION INTRAMUSCULAR; INTRAVENOUS; SUBCUTANEOUS at 05:33

## 2019-12-17 RX ADMIN — Medication 100 MILLIGRAM(S): at 05:34

## 2019-12-17 RX ADMIN — SENNA PLUS 2 TABLET(S): 8.6 TABLET ORAL at 23:50

## 2019-12-17 RX ADMIN — Medication 120 MILLIGRAM(S): at 05:33

## 2019-12-17 RX ADMIN — SIMVASTATIN 40 MILLIGRAM(S): 20 TABLET, FILM COATED ORAL at 23:50

## 2019-12-17 RX ADMIN — GABAPENTIN 400 MILLIGRAM(S): 400 CAPSULE ORAL at 05:33

## 2019-12-17 RX ADMIN — POLYETHYLENE GLYCOL 3350 17 GRAM(S): 17 POWDER, FOR SOLUTION ORAL at 05:33

## 2019-12-17 RX ADMIN — HYDROMORPHONE HYDROCHLORIDE 2 MILLIGRAM(S): 2 INJECTION INTRAMUSCULAR; INTRAVENOUS; SUBCUTANEOUS at 11:54

## 2019-12-17 RX ADMIN — Medication 100 MILLIGRAM(S): at 05:33

## 2019-12-17 RX ADMIN — GABAPENTIN 400 MILLIGRAM(S): 400 CAPSULE ORAL at 17:28

## 2019-12-17 RX ADMIN — WARFARIN SODIUM 0.5 MILLIGRAM(S): 2.5 TABLET ORAL at 23:50

## 2019-12-17 RX ADMIN — CEFTRIAXONE 100 MILLIGRAM(S): 500 INJECTION, POWDER, FOR SOLUTION INTRAMUSCULAR; INTRAVENOUS at 11:48

## 2019-12-17 RX ADMIN — Medication 100 MILLIGRAM(S): at 23:50

## 2019-12-17 RX ADMIN — Medication 150 MICROGRAM(S): at 05:33

## 2019-12-17 RX ADMIN — QUETIAPINE FUMARATE 25 MILLIGRAM(S): 200 TABLET, FILM COATED ORAL at 23:50

## 2019-12-17 RX ADMIN — Medication 3 MILLIGRAM(S): at 23:50

## 2019-12-17 RX ADMIN — POLYETHYLENE GLYCOL 3350 17 GRAM(S): 17 POWDER, FOR SOLUTION ORAL at 17:28

## 2019-12-17 RX ADMIN — HYDROMORPHONE HYDROCHLORIDE 2 MILLIGRAM(S): 2 INJECTION INTRAMUSCULAR; INTRAVENOUS; SUBCUTANEOUS at 06:04

## 2019-12-17 RX ADMIN — HYDROMORPHONE HYDROCHLORIDE 2 MILLIGRAM(S): 2 INJECTION INTRAMUSCULAR; INTRAVENOUS; SUBCUTANEOUS at 12:50

## 2019-12-17 RX ADMIN — ISOSORBIDE MONONITRATE 30 MILLIGRAM(S): 60 TABLET, EXTENDED RELEASE ORAL at 11:48

## 2019-12-17 NOTE — PROGRESS NOTE ADULT - PROBLEM SELECTOR PLAN 8
Cont Diltiazem 120mg PO daily  Coumadin 1.5 mg qhs with daily INR today Supratherapeutic will hold additional  dose.   Monitor for bleeding. No s/s of this.

## 2019-12-17 NOTE — CONSULT NOTE ADULT - SUBJECTIVE AND OBJECTIVE BOX
HPI:  84 F PMH Afib on Coumadin, AVR, HTN, HLD, CKD3, hip fx, hypothyroidism, neuropathy with multiple analgesic allergies on dilaudid/gabapentin, chronic constipation, p/w R sided abdominal pain.  +constant pain x few days. Last BM >8 days ago, usually goes once a week at most. Today pt had her HHA give her an enema with some stool output, but this was stopped 2/2 abd pain. [place] cp/sob/f/c/n/v/d/dysuria/cough. Denies hematochezia/melena. Ordinarily takes senna/colace, occasionally lactulose. No travel/sick contacts. Collateral obtained from , in agreement with history. Pt is bed bound and dependent on all ADLs.   also notes increasing forgetfullness and mood swings at home.     Vs: 97.6, 101 --> 73, 119/61, 18, 94% RA.  Labs: Leukocytosis 19.7, chronic stable anemia, INR 2.11 in setting of coumadin, hypoKalemia 2.9, stable ckd3, Lactate 2.6 --> 1.6. CXR prelim KARRIE.  CTAP +stercoral colitis, +pulm edema. In ER pt received IV cipro/flagyl, potassium 40 po + 3 runs IV, IVF prior to medicine team involvement. (11 Dec 2019 23:50)      amoxicillin (Unknown)  Cymbalta (Unknown)  Lyrica (Unknown)  oxycodone (Unknown)  penicillin (Unknown)  Prozac (Unknown)  Savella (Unknown)      PAST MEDICAL & SURGICAL HISTORY:  PVD (peripheral vascular disease)  Spinal stenosis of lumbar region  CHF (congestive heart failure)  Afib  HLD (hyperlipidemia)  Neuropathy  Hypothyroid  HTN (hypertension)  History of open heart surgery: biosynthetic aortic 2014  Knee fracture, left  Status post hip replacement: right hip 2006  H/O heart surgery        Home Medications:  Coumadin 1 mg oral tablet: 1.5 tab(s) orally once a day (at bedtime) (12 Dec 2019 00:50)  Dilaudid 2 mg oral tablet: 1 tab(s) orally every 6 hours, As Needed (12 Dec 2019 00:50)  diltiazem 120 mg/24 hours oral capsule, extended release: 1 cap(s) orally once a day (12 Dec 2019 00:50)  docusate sodium 100 mg oral capsule: 1 cap(s) orally 3 times a day (12 Dec 2019 00:50)  gabapentin 400 mg oral capsule: 1 cap(s) orally 2 times a day (12 Dec 2019 00:50)  isosorbide mononitrate 30 mg oral tablet, extended release: 1 tab(s) orally once a day (in the morning) (12 Dec 2019 00:50)  Lasix 40 mg oral tablet: 1 tab(s) orally once a day (12 Dec 2019 00:50)  levothyroxine 150 mcg (0.15 mg) oral tablet: 1 tab(s) orally once a day (12 Dec 2019 00:50)  metOLazone 2.5 mg oral tablet: 1 tab(s) orally once a day (12 Dec 2019 00:50)  Multiple Vitamins oral tablet: 1 tab(s) orally once a day (12 Dec 2019 00:50)  polyethylene glycol 3350 oral powder for reconstitution: 17 gram(s) orally once a day (12 Dec 2019 00:50)  senna oral tablet: 2 tab(s) orally once a day (at bedtime), As needed, Constipation (12 Dec 2019 00:50)  simvastatin 40 mg oral tablet: 1 tab(s) orally once a day (at bedtime) (12 Dec 2019 00:50)  Toprol- mg oral tablet, extended release: 1 tab(s) orally once a day (12 Dec 2019 00:50)      Social History:  ETOH:  TOB:  Illicits:  Work/Home:    FAMILY HISTORY:  No pertinent family history in first degree relatives      REVIEW OF SYSTEMS:    CONSTITUTIONAL: No weakness, fatigue, malaise, fevers or chills, no weight change, appetite change  EYES: No visual changes; No double vision,  No vertigo, eye pain  Ears: no otalgia, no otorhea, no hearing loss, tinnitus  Nose: no epistaxis, rhinorrhea, post-discharge, sinus pressure  Throat: no throat pain, no oral lesions, tooth pain   NECK: No pain or stiffness  RESPIRATORY: No cough (productive or dry), wheezing, hemoptysis; No shortness of breath, orthnopnea, PND, BUNDY, snoring,  CARDIOVASCULAR: No chest pain or palpitations, no leg edema, no claudication    GASTROINTESTINAL: No abdominal or epigastric pain. No nausea, vomiting, or hematemesis; No diarrhea or constipation. No melena or hematochezia.  GENITOURINARY: No dysuria, frequency, urgency or hematuria, no pelvic pain, urinary incontinence, urgency  MUSCULOSKELETAL: no joints or muscle pain, no swelling in joints or muscles  NEUROLOGICAL: No numbness or weakness, headache, memory loss, seizures, dizziness, vertigo, syncope, ataxia  SKIN: No pruritis, rashes, lesions or new moles  Psych: No anxiety, sadness, insomnia, suicide thoughts  Endocrine: No Heat or Cold intolerance, polydipsia, polyphagia  Heme/Lymph: no LN enlargement, no easy bruising or bleeding         MEDICATIONS  (STANDING):  cefTRIAXone   IVPB 1000 milliGRAM(s) IV Intermittent every 24 hours  diltiazem    milliGRAM(s) Oral daily  gabapentin 400 milliGRAM(s) Oral two times a day  isosorbide   mononitrate ER Tablet (IMDUR) 30 milliGRAM(s) Oral daily  lactulose Retention Enema 200 Gram(s) Rectal once  levothyroxine 150 MICROGram(s) Oral daily  melatonin 3 milliGRAM(s) Oral at bedtime  metoprolol succinate  milliGRAM(s) Oral daily  metroNIDAZOLE  IVPB 500 milliGRAM(s) IV Intermittent every 8 hours  multivitamin 1 Tablet(s) Oral daily  mupirocin 2% Ointment 1 Application(s) Topical <User Schedule>  polyethylene glycol 3350 17 Gram(s) Oral two times a day  QUEtiapine 25 milliGRAM(s) Oral at bedtime  senna 2 Tablet(s) Oral at bedtime  simvastatin 40 milliGRAM(s) Oral at bedtime    MEDICATIONS  (PRN):  bisacodyl Suppository 10 milliGRAM(s) Rectal once PRN Constipation  HYDROmorphone   Tablet 2 milliGRAM(s) Oral every 6 hours PRN Severe Pain (7 - 10)  QUEtiapine 25 milliGRAM(s) Oral every 12 hours PRN agitation        Vital Signs Last 24 Hrs  T(C): 36.3 (17 Dec 2019 05:41), Max: 36.6 (16 Dec 2019 14:58)  T(F): 97.3 (17 Dec 2019 05:41), Max: 97.9 (16 Dec 2019 14:58)  HR: 65 (17 Dec 2019 05:41) (62 - 70)  BP: 134/75 (17 Dec 2019 05:41) (125/63 - 134/75)  BP(mean): --  RR: 18 (17 Dec 2019 05:41) (18 - 19)  SpO2: 94% (17 Dec 2019 05:41) (91% - 96%)    General: NAD, Pleasant  Neurology: Patient is AA&Ox4, follows commands, and speech fluent. EOMI intact, PERRLA, 3mm--2mm. Sensation in the Trigeminal distribution is wnl and symmetrical. Facial muscles intact and symmetrical. Hearing appropriate. Uvula rises equally upon phonation and tongue protrudes symmetrically. SCM/Trapezius 5/5 power.  Neck: Neck supple, trachea midline, No JVD  Respiratory: Rhonchi to anterior chest wall, decreased bilat bases  CV: S1S2, irr/irr, (-)m/r/g  Abdomen: S/NT/ND, BSx4  Extremities: 2+ peripheral pulses bilat UE's; (+)dopplerable signal to bilat LE's  Skin: (+)Chronic venous stasis dermatitis to bilat LE's; (+)Bilat heel ulcers; (+)decreased sensation to bilat LE's from ankle downward     LABS:                        9.6    7.87  )-----------( 246      ( 17 Dec 2019 09:18 )             31.4     12-17    140  |  98  |  42<H>  ----------------------------<  78  4.1   |  29  |  1.81<H>    Ca    8.8      17 Dec 2019 07:08    TPro  6.2  /  Alb  2.4<L>  /  TBili  0.2  /  DBili  x   /  AST  14  /  ALT  7<L>  /  AlkPhos  113  12-16    PT/INR - ( 17 Dec 2019 09:15 )   PT: 36.9 sec;   INR: 3.15 ratio         PTT - ( 17 Dec 2019 09:15 )  PTT:54.5 sec        RADIOLOGY & ADDITIONAL STUDIES:  < from: [facility] Physiol Extremity Lower 3+ Level, BI (12.16.19 @ 14:16) >    INTERPRETATION:  History: Previous right lower extremity stenting,   currently bilateral heel ulcers.    The pulse volume recordings show diminished amplitude at the levels of   the right lower thigh, right upper calf and ankle and nonpulsatile flow   at the level of the toes.    On the left, there is diminished amplitude to the pulse volume recordings   at the level of the upper thigh, left ankle at the level of the left toes.    After the pulse volume recordings were obtained, but before segmental   arterial pressures could be obtained, the patient requested the   examination be stopped.    Impression: The patient refused a complete examination.    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    The patient can be returned for completion of this examination when   better able to cooperate or perhaps better for an arterial duplex   examination of the lower extremities.

## 2019-12-17 NOTE — PROGRESS NOTE ADULT - PROBLEM SELECTOR PLAN 1
Stercoral colitis with large stool burden on CTAP  Cont senna, Miralax daily, needs BM daily.   Bcx obtained however got abx prior.   On ctx and flagyl and can switch to PO on DC to complete 10 days.   Improving.

## 2019-12-17 NOTE — PROGRESS NOTE ADULT - SUBJECTIVE AND OBJECTIVE BOX
PROGRESS NOTE:     Patient is a 84y old  Female who presents with a chief complaint of stercoral colitis, hypokalemia (17 Dec 2019 11:39)      SUBJECTIVE / OVERNIGHT EVENTS: SUE less pain today.  Unable to tolerate US yesterday due to pain.     ADDITIONAL REVIEW OF SYSTEMS:  No fevers or chills  No n/v/d    MEDICATIONS  (STANDING):  cefTRIAXone   IVPB 1000 milliGRAM(s) IV Intermittent every 24 hours  diltiazem    milliGRAM(s) Oral daily  gabapentin 400 milliGRAM(s) Oral two times a day  isosorbide   mononitrate ER Tablet (IMDUR) 30 milliGRAM(s) Oral daily  lactulose Retention Enema 200 Gram(s) Rectal once  levothyroxine 150 MICROGram(s) Oral daily  melatonin 3 milliGRAM(s) Oral at bedtime  metoprolol succinate  milliGRAM(s) Oral daily  metroNIDAZOLE  IVPB 500 milliGRAM(s) IV Intermittent every 8 hours  multivitamin 1 Tablet(s) Oral daily  mupirocin 2% Ointment 1 Application(s) Topical <User Schedule>  polyethylene glycol 3350 17 Gram(s) Oral two times a day  QUEtiapine 25 milliGRAM(s) Oral at bedtime  senna 2 Tablet(s) Oral at bedtime  simvastatin 40 milliGRAM(s) Oral at bedtime    MEDICATIONS  (PRN):  bisacodyl Suppository 10 milliGRAM(s) Rectal once PRN Constipation  HYDROmorphone   Tablet 2 milliGRAM(s) Oral every 6 hours PRN Severe Pain (7 - 10)  QUEtiapine 25 milliGRAM(s) Oral every 12 hours PRN agitation      CAPILLARY BLOOD GLUCOSE        I&O's Summary    16 Dec 2019 07:01  -  17 Dec 2019 07:00  --------------------------------------------------------  IN: 920 mL / OUT: 975 mL / NET: -55 mL        PHYSICAL EXAM:  Vital Signs Last 24 Hrs  T(C): 36.3 (17 Dec 2019 05:41), Max: 36.6 (16 Dec 2019 14:58)  T(F): 97.3 (17 Dec 2019 05:41), Max: 97.9 (16 Dec 2019 14:58)  HR: 68 (17 Dec 2019 11:47) (62 - 70)  BP: 116/67 (17 Dec 2019 11:47) (116/67 - 134/75)  BP(mean): --  RR: 18 (17 Dec 2019 05:41) (18 - 19)  SpO2: 94% (17 Dec 2019 05:41) (91% - 96%)    CONSTITUTIONAL: NAD, well-developed, obese, non toxic   RESPIRATORY: Normal respiratory effort; lungs are clear to auscultation bilaterally  CARDIOVASCULAR: Regular rate and rhythm, normal S1 and S2, no murmur/rub/gallop; No lower extremity edema; Peripheral pulses are not palpable b/l   ABDOMEN: Nontender to palpation, normoactive bowel sounds, no rebound/guarding; No hepatosplenomegaly  MUSCLOSKELETAL: no clubbing or cyanosis of digits; TTP of the right hip joint and on b/l legs  PSYCH: A+O to person, place, and time; affect appropriate    LABS:                        9.6    7.87  )-----------( 246      ( 17 Dec 2019 09:18 )             31.4     12-17    140  |  98  |  42<H>  ----------------------------<  78  4.1   |  29  |  1.81<H>    Ca    8.8      17 Dec 2019 07:08    TPro  6.2  /  Alb  2.4<L>  /  TBili  0.2  /  DBili  x   /  AST  14  /  ALT  7<L>  /  AlkPhos  113  12-16    PT/INR - ( 17 Dec 2019 09:15 )   PT: 36.9 sec;   INR: 3.15 ratio         PTT - ( 17 Dec 2019 09:15 )  PTT:54.5 sec            RADIOLOGY & ADDITIONAL TESTS:  Results Reviewed:   Imaging Personally Reviewed:  Electrocardiogram Personally Reviewed:    COORDINATION OF CARE:  Care Discussed with Consultants/Other Providers [Y/N]:  Prior or Outpatient Records Reviewed [Y/N]:

## 2019-12-17 NOTE — CONSULT NOTE ADULT - ASSESSMENT
This is a 84 F PMH Afib on Coumadin, AVR, HTN, HLD, CKD3, hip fx, hypothyroidism, neuropathy with multiple analgesic allergies on dilaudid/gabapentin, chronic constipation, PAD, s/p R SFA Stent in 6/2015 with continued LE pain and chronic wounds    -No acute vascular surgery intervention warranted  -Rec repeat YSABEL/PVR  -May need LE angio as an outpt if unable to obtain YSABEL/PVR  -LE pain is chronic and would rec chronic pain consult for optimization of multimodal pain control  -Wound care as per Wound Care Team    P#[phone]

## 2019-12-17 NOTE — PROGRESS NOTE ADULT - PROBLEM SELECTOR PLAN 4
Podiatry recs appreciated.  YSABEL and arterial duplex pt not able to tolerate  C/w abx as above however per podiatry, unlikely to be infected.  Dilauidid 2mg PO Q6hrs pRN pain  Gabapentin 400mg PO BID  Spoke with vascular surgery to see if she is able to get angio inpt as it is very difficult for her to get anything outpt due to sever pain and limited mobility.

## 2019-12-18 VITALS
HEART RATE: 80 BPM | RESPIRATION RATE: 18 BRPM | SYSTOLIC BLOOD PRESSURE: 108 MMHG | DIASTOLIC BLOOD PRESSURE: 62 MMHG | TEMPERATURE: 98 F | OXYGEN SATURATION: 91 %

## 2019-12-18 DIAGNOSIS — E66.9 OBESITY, UNSPECIFIED: ICD-10-CM

## 2019-12-18 LAB
ALBUMIN SERPL ELPH-MCNC: 2.5 G/DL — LOW (ref 3.3–5)
ALP SERPL-CCNC: 107 U/L — SIGNIFICANT CHANGE UP (ref 40–120)
ALT FLD-CCNC: 7 U/L — LOW (ref 10–45)
ANION GAP SERPL CALC-SCNC: 13 MMOL/L — SIGNIFICANT CHANGE UP (ref 5–17)
APTT BLD: 52.2 SEC — HIGH (ref 27.5–36.3)
AST SERPL-CCNC: 14 U/L — SIGNIFICANT CHANGE UP (ref 10–40)
BILIRUB SERPL-MCNC: 0.1 MG/DL — LOW (ref 0.2–1.2)
BUN SERPL-MCNC: 38 MG/DL — HIGH (ref 7–23)
CALCIUM SERPL-MCNC: 9.1 MG/DL — SIGNIFICANT CHANGE UP (ref 8.4–10.5)
CHLORIDE SERPL-SCNC: 100 MMOL/L — SIGNIFICANT CHANGE UP (ref 96–108)
CO2 SERPL-SCNC: 25 MMOL/L — SIGNIFICANT CHANGE UP (ref 22–31)
CREAT SERPL-MCNC: 1.89 MG/DL — HIGH (ref 0.5–1.3)
CULTURE RESULTS: SIGNIFICANT CHANGE UP
CULTURE RESULTS: SIGNIFICANT CHANGE UP
GLUCOSE SERPL-MCNC: 109 MG/DL — HIGH (ref 70–99)
INR BLD: 3.11 RATIO — HIGH (ref 0.88–1.16)
POTASSIUM SERPL-MCNC: 4.2 MMOL/L — SIGNIFICANT CHANGE UP (ref 3.5–5.3)
POTASSIUM SERPL-SCNC: 4.2 MMOL/L — SIGNIFICANT CHANGE UP (ref 3.5–5.3)
PROT SERPL-MCNC: 6.1 G/DL — SIGNIFICANT CHANGE UP (ref 6–8.3)
PROTHROM AB SERPL-ACNC: 36.7 SEC — HIGH (ref 10–13.1)
SODIUM SERPL-SCNC: 138 MMOL/L — SIGNIFICANT CHANGE UP (ref 135–145)
SPECIMEN SOURCE: SIGNIFICANT CHANGE UP
SPECIMEN SOURCE: SIGNIFICANT CHANGE UP

## 2019-12-18 PROCEDURE — 87086 URINE CULTURE/COLONY COUNT: CPT

## 2019-12-18 PROCEDURE — 97110 THERAPEUTIC EXERCISES: CPT

## 2019-12-18 PROCEDURE — 84300 ASSAY OF URINE SODIUM: CPT

## 2019-12-18 PROCEDURE — 85610 PROTHROMBIN TIME: CPT

## 2019-12-18 PROCEDURE — 82435 ASSAY OF BLOOD CHLORIDE: CPT

## 2019-12-18 PROCEDURE — 82947 ASSAY GLUCOSE BLOOD QUANT: CPT

## 2019-12-18 PROCEDURE — 80048 BASIC METABOLIC PNL TOTAL CA: CPT

## 2019-12-18 PROCEDURE — 74018 RADEX ABDOMEN 1 VIEW: CPT

## 2019-12-18 PROCEDURE — 82565 ASSAY OF CREATININE: CPT

## 2019-12-18 PROCEDURE — 83735 ASSAY OF MAGNESIUM: CPT

## 2019-12-18 PROCEDURE — 80053 COMPREHEN METABOLIC PANEL: CPT

## 2019-12-18 PROCEDURE — 84132 ASSAY OF SERUM POTASSIUM: CPT

## 2019-12-18 PROCEDURE — 97161 PT EVAL LOW COMPLEX 20 MIN: CPT

## 2019-12-18 PROCEDURE — 85652 RBC SED RATE AUTOMATED: CPT

## 2019-12-18 PROCEDURE — 82330 ASSAY OF CALCIUM: CPT

## 2019-12-18 PROCEDURE — 83690 ASSAY OF LIPASE: CPT

## 2019-12-18 PROCEDURE — 81001 URINALYSIS AUTO W/SCOPE: CPT

## 2019-12-18 PROCEDURE — 74177 CT ABD & PELVIS W/CONTRAST: CPT

## 2019-12-18 PROCEDURE — 87040 BLOOD CULTURE FOR BACTERIA: CPT

## 2019-12-18 PROCEDURE — 93923 UPR/LXTR ART STDY 3+ LVLS: CPT

## 2019-12-18 PROCEDURE — 97530 THERAPEUTIC ACTIVITIES: CPT

## 2019-12-18 PROCEDURE — 83605 ASSAY OF LACTIC ACID: CPT

## 2019-12-18 PROCEDURE — 85014 HEMATOCRIT: CPT

## 2019-12-18 PROCEDURE — 93005 ELECTROCARDIOGRAM TRACING: CPT

## 2019-12-18 PROCEDURE — 86140 C-REACTIVE PROTEIN: CPT

## 2019-12-18 PROCEDURE — 51701 INSERT BLADDER CATHETER: CPT

## 2019-12-18 PROCEDURE — 99285 EMERGENCY DEPT VISIT HI MDM: CPT | Mod: 25

## 2019-12-18 PROCEDURE — 84295 ASSAY OF SERUM SODIUM: CPT

## 2019-12-18 PROCEDURE — 82803 BLOOD GASES ANY COMBINATION: CPT

## 2019-12-18 PROCEDURE — 85027 COMPLETE CBC AUTOMATED: CPT

## 2019-12-18 PROCEDURE — 99239 HOSP IP/OBS DSCHRG MGMT >30: CPT

## 2019-12-18 PROCEDURE — 84156 ASSAY OF PROTEIN URINE: CPT

## 2019-12-18 PROCEDURE — 82570 ASSAY OF URINE CREATININE: CPT

## 2019-12-18 PROCEDURE — 84540 ASSAY OF URINE/UREA-N: CPT

## 2019-12-18 PROCEDURE — 84100 ASSAY OF PHOSPHORUS: CPT

## 2019-12-18 PROCEDURE — 85730 THROMBOPLASTIN TIME PARTIAL: CPT

## 2019-12-18 PROCEDURE — 71045 X-RAY EXAM CHEST 1 VIEW: CPT

## 2019-12-18 PROCEDURE — 73620 X-RAY EXAM OF FOOT: CPT

## 2019-12-18 PROCEDURE — 83935 ASSAY OF URINE OSMOLALITY: CPT

## 2019-12-18 RX ORDER — METRONIDAZOLE 500 MG
500 TABLET ORAL EVERY 8 HOURS
Refills: 0 | Status: DISCONTINUED | OUTPATIENT
Start: 2019-12-19 | End: 2019-12-18

## 2019-12-18 RX ORDER — QUETIAPINE FUMARATE 200 MG/1
1 TABLET, FILM COATED ORAL
Qty: 0 | Refills: 0 | DISCHARGE
Start: 2019-12-18

## 2019-12-18 RX ORDER — CIPROFLOXACIN LACTATE 400MG/40ML
1 VIAL (ML) INTRAVENOUS
Qty: 10 | Refills: 0
Start: 2019-12-18 | End: 2019-12-22

## 2019-12-18 RX ORDER — CIPROFLOXACIN LACTATE 400MG/40ML
500 VIAL (ML) INTRAVENOUS EVERY 12 HOURS
Refills: 0 | Status: DISCONTINUED | OUTPATIENT
Start: 2019-12-19 | End: 2019-12-18

## 2019-12-18 RX ORDER — WARFARIN SODIUM 2.5 MG/1
1 TABLET ORAL
Qty: 5 | Refills: 0
Start: 2019-12-18 | End: 2019-12-22

## 2019-12-18 RX ORDER — METRONIDAZOLE 500 MG
1 TABLET ORAL
Qty: 15 | Refills: 0
Start: 2019-12-18 | End: 2019-12-22

## 2019-12-18 RX ORDER — WARFARIN SODIUM 2.5 MG/1
1.5 TABLET ORAL
Qty: 0 | Refills: 0 | DISCHARGE

## 2019-12-18 RX ORDER — WARFARIN SODIUM 2.5 MG/1
1 TABLET ORAL
Qty: 0 | Refills: 0 | DISCHARGE

## 2019-12-18 RX ORDER — POLYETHYLENE GLYCOL 3350 17 G/17G
17 POWDER, FOR SOLUTION ORAL
Qty: 0 | Refills: 0 | DISCHARGE
Start: 2019-12-18

## 2019-12-18 RX ORDER — WARFARIN SODIUM 2.5 MG/1
1 TABLET ORAL AT BEDTIME
Refills: 0 | Status: DISCONTINUED | OUTPATIENT
Start: 2019-12-18 | End: 2019-12-18

## 2019-12-18 RX ORDER — QUETIAPINE FUMARATE 200 MG/1
1 TABLET, FILM COATED ORAL
Qty: 30 | Refills: 0
Start: 2019-12-18 | End: 2020-01-16

## 2019-12-18 RX ORDER — QUETIAPINE FUMARATE 200 MG/1
1 TABLET, FILM COATED ORAL
Qty: 60 | Refills: 0
Start: 2019-12-18 | End: 2020-01-16

## 2019-12-18 RX ADMIN — GABAPENTIN 400 MILLIGRAM(S): 400 CAPSULE ORAL at 06:12

## 2019-12-18 RX ADMIN — Medication 150 MICROGRAM(S): at 06:12

## 2019-12-18 RX ADMIN — ISOSORBIDE MONONITRATE 30 MILLIGRAM(S): 60 TABLET, EXTENDED RELEASE ORAL at 11:41

## 2019-12-18 RX ADMIN — CEFTRIAXONE 100 MILLIGRAM(S): 500 INJECTION, POWDER, FOR SOLUTION INTRAMUSCULAR; INTRAVENOUS at 11:45

## 2019-12-18 RX ADMIN — MUPIROCIN 1 APPLICATION(S): 20 OINTMENT TOPICAL at 11:47

## 2019-12-18 RX ADMIN — POLYETHYLENE GLYCOL 3350 17 GRAM(S): 17 POWDER, FOR SOLUTION ORAL at 06:13

## 2019-12-18 RX ADMIN — Medication 100 MILLIGRAM(S): at 06:12

## 2019-12-18 RX ADMIN — Medication 120 MILLIGRAM(S): at 06:12

## 2019-12-18 RX ADMIN — Medication 100 MILLIGRAM(S): at 11:42

## 2019-12-18 RX ADMIN — Medication 1 TABLET(S): at 11:41

## 2019-12-18 NOTE — PROGRESS NOTE ADULT - PROBLEM SELECTOR PLAN 1
Stercoral colitis with large stool burden resolved  Cont senna, Miralax daily, needs BM daily.   On ctx and flagyl and can switch to PO on DC to complete 10 days.   Improving.

## 2019-12-18 NOTE — PROGRESS NOTE ADULT - PROBLEM SELECTOR PLAN 4
Podiatry recs appreciated.  YSABEL and arterial duplex pt not able to tolerate   Discussed with vascular surgery resident and attending they will not be able to perform angiogram inpatient though would be the next step as she cannot tolerate the US and ABIs.  They say nothing emergent to do so cannot be done inpatient.   Dilauidid 2mg PO Q6hrs pRN pain  Gabapentin 400mg PO BID

## 2019-12-18 NOTE — PROGRESS NOTE ADULT - SUBJECTIVE AND OBJECTIVE BOX
PROGRESS NOTE:     Patient is a 84y old  Female who presents with a chief complaint of stercoral colitis, hypokalemia (17 Dec 2019 14:15)      SUBJECTIVE / OVERNIGHT EVENTS: Pt OOB to chair today planning for DC.     ADDITIONAL REVIEW OF SYSTEMS:  No fevers or chills  No n/v/d    MEDICATIONS  (STANDING):  cefTRIAXone   IVPB 1000 milliGRAM(s) IV Intermittent every 24 hours  diltiazem    milliGRAM(s) Oral daily  gabapentin 400 milliGRAM(s) Oral two times a day  isosorbide   mononitrate ER Tablet (IMDUR) 30 milliGRAM(s) Oral daily  lactulose Retention Enema 200 Gram(s) Rectal once  levothyroxine 150 MICROGram(s) Oral daily  melatonin 3 milliGRAM(s) Oral at bedtime  metoprolol succinate  milliGRAM(s) Oral daily  metroNIDAZOLE  IVPB 500 milliGRAM(s) IV Intermittent every 8 hours  multivitamin 1 Tablet(s) Oral daily  mupirocin 2% Ointment 1 Application(s) Topical <User Schedule>  polyethylene glycol 3350 17 Gram(s) Oral two times a day  QUEtiapine 25 milliGRAM(s) Oral at bedtime  senna 2 Tablet(s) Oral at bedtime  simvastatin 40 milliGRAM(s) Oral at bedtime  warfarin 1 milliGRAM(s) Oral at bedtime    MEDICATIONS  (PRN):  bisacodyl Suppository 10 milliGRAM(s) Rectal once PRN Constipation  HYDROmorphone   Tablet 2 milliGRAM(s) Oral every 6 hours PRN Severe Pain (7 - 10)  QUEtiapine 25 milliGRAM(s) Oral every 12 hours PRN agitation      CAPILLARY BLOOD GLUCOSE        I&O's Summary    17 Dec 2019 07:01  -  18 Dec 2019 07:00  --------------------------------------------------------  IN: 940 mL / OUT: 850 mL / NET: 90 mL    18 Dec 2019 07:01  -  18 Dec 2019 14:38  --------------------------------------------------------  IN: 150 mL / OUT: 0 mL / NET: 150 mL        PHYSICAL EXAM:  Vital Signs Last 24 Hrs  T(C): 36.4 (18 Dec 2019 12:19), Max: 36.7 (17 Dec 2019 20:59)  T(F): 97.6 (18 Dec 2019 12:19), Max: 98.1 (17 Dec 2019 20:59)  HR: 80 (18 Dec 2019 12:19) (80 - 90)  BP: 108/62 (18 Dec 2019 12:19) (96/57 - 119/74)  BP(mean): --  RR: 18 (18 Dec 2019 12:19) (18 - 18)  SpO2: 91% (18 Dec 2019 12:19) (91% - 94%)    CONSTITUTIONAL: NAD, well-developed, obese, non toxic  RESPIRATORY: Normal respiratory effort; lungs are clear to auscultation bilaterally  CARDIOVASCULAR: Regular rate and rhythm, normal S1 and S2, no murmur/rub/gallop; No lower extremity edema; Peripheral pulses are 0 bilaterally  ABDOMEN: Nontender to palpation, normoactive bowel sounds, no rebound/guarding; No hepatosplenomegaly  MUSCLOSKELETAL: no clubbing or cyanosis of digits; pain to palpation of hip joint and ankles  PSYCH: A+O to person, place, and time; affect appropriate    LABS:                        9.6    7.87  )-----------( 246      ( 17 Dec 2019 09:18 )             31.4     12-18    138  |  100  |  38<H>  ----------------------------<  109<H>  4.2   |  25  |  1.89<H>    Ca    9.1      18 Dec 2019 05:45    TPro  6.1  /  Alb  2.5<L>  /  TBili  0.1<L>  /  DBili  x   /  AST  14  /  ALT  7<L>  /  AlkPhos  107  12-18    PT/INR - ( 18 Dec 2019 08:19 )   PT: 36.7 sec;   INR: 3.11 ratio         PTT - ( 18 Dec 2019 08:19 )  PTT:52.2 sec            RADIOLOGY & ADDITIONAL TESTS:  Results Reviewed:   Imaging Personally Reviewed:  Electrocardiogram Personally Reviewed:    COORDINATION OF CARE:  Care Discussed with Consultants/Other Providers [Y/N]:  Prior or Outpatient Records Reviewed [Y/N]:

## 2019-12-18 NOTE — PROGRESS NOTE ADULT - ATTENDING COMMENTS
Patient seen at bedside.  Wounds appear superficial and primarily granular.  Strictly offload heels at all times.  Will follow.
Suni Childress DO  Hospitalist  [phone]    F/U final vascular recs if she can have angio done inpt for severe leg pain with non healing ulcers on the heels.  if not she will DC home with her private hire aides.
Suni Childress DO  Hospitalist  [phone]  Plan for DC tomorrow after dopplers complete to Rehab on PO antibiotics.
Suni Childress DO  Hospitalist  [phone]    DC today with home aides and home care for INR checks and PT.  Discharge time 54 minutes.

## 2019-12-18 NOTE — PROGRESS NOTE ADULT - PROBLEM SELECTOR PLAN 8
Cont Diltiazem 120mg PO daily  Coumadin 1.5 mg qhs now likely having supratheraputic INR due to interaction with antibiotic so discussed with pharmacy and will give 1mg PO daily and she will have INR checked with home care at home and adjust as necessary until after antibiotics finish in 5 days. Then will likely resume home dose.  Monitor for bleeding. No s/s of this.

## 2019-12-18 NOTE — PROGRESS NOTE ADULT - REASON FOR ADMISSION
stercoral colitis, hypokalemia

## 2019-12-18 NOTE — PROGRESS NOTE ADULT - PROBLEM SELECTOR PLAN 3
Resolved  No signs of post renal etiology. Voiding without issue. No bladder fullness.  Bladder scan with 202 cc post void.   Monitor BMP.   No s/s of volume overload.   Monitor volume status closely.

## 2019-12-18 NOTE — CHART NOTE - NSCHARTNOTEFT_GEN_A_CORE
Based on patients ongoing issues with deconditioning and generalized weakness secondary to patients diagnosis of PVD, neuropathy and obesity. Patient will require a salima lift. This is necessary to transfer patient from bed to chair and vice versa.

## 2020-01-02 NOTE — PATIENT PROFILE ADULT - ..
"Requested Prescriptions   Pending Prescriptions Disp Refills     lisinopril (PRINIVIL/ZESTRIL) 10 MG tablet [Pharmacy Med Name: LISINOPRIL 10MG TABLETS] 90 tablet 3     Sig: TAKE 1 TABLET BY MOUTH EVERY DAY  Last Written Prescription Date:  10/7/19  Last Fill Quantity: 90 tab,  # refills: 3   Last office visit: 10/7/2019 with prescribing provider: Strong   Future Office Visit:         ACE Inhibitors (Including Combos) Protocol Failed - 1/2/2020  3:19 AM        Failed - Recent (12 mo) or future (30 days) visit within the authorizing provider's specialty     Patient has had an office visit with the authorizing provider or a provider within the authorizing providers department within the previous 12 mos or has a future within next 30 days. See \"Patient Info\" tab in inbasket, or \"Choose Columns\" in Meds & Orders section of the refill encounter.              Passed - Blood pressure under 140/90 in past 12 months     BP Readings from Last 3 Encounters:   10/07/19 132/80   09/10/18 134/78   08/07/18 (!) 184/94                 Passed - Medication is active on med list        Passed - Patient is age 18 or older        Passed - No active pregnancy on record        Passed - Normal serum creatinine on file in past 12 months     Recent Labs   Lab Test 10/07/19  1129   CR 0.77             Passed - Normal serum potassium on file in past 12 months     Recent Labs   Lab Test 10/07/19  1129   POTASSIUM 4.0             Passed - No positive pregnancy test within past 12 months           " 12-Dec-2019 02:30:09

## 2021-10-17 ENCOUNTER — FORM ENCOUNTER (OUTPATIENT)
Age: 86
End: 2021-10-17

## 2024-01-29 NOTE — PROGRESS NOTE ADULT - PROBLEM SELECTOR PLAN 6
Transitions of Care Status:  1.  Name of PCP: Nasir Caban  2.  PCP Contacted on Admission: [ ] Y    [x ] N    3.  PCP contacted at Discharge: [ ] Y    [ ] N    [ ] N/A  4.  Post-Discharge Appointment Date and Location:  5.  Summary of Handoff given to PCP: No. HARRISON screening performed.  STOP BANG Legend: 0-2 = LOW Risk; 3-4 = INTERMEDIATE Risk; 5-8 = HIGH Risk

## 2024-02-14 NOTE — PHARMACOTHERAPY INTERVENTION NOTE - COMMENTS
Patient was on warfarin 1.5mg daily at home. INR yesterday was 3.17 from 2.5, thus dose was held last night 12/16. INR today is 3.15. INR expected to decrease steeply due to held dose last night. Would recommend re-dosing warfarin at reduced dose of 0.5mg x1 dose tonight.     Libia Jones, PharmD  PGY1 Pharmacy Practice Resident  [phone] Patient was on warfarin 1.5mg daily at home. Dose was held last night 12/16 due to INR increase from 2.5 to 3.17. INR today is 3.15. Would recommend re-dosing warfarin at reduced dose of 0.5mg x1 dose tonight, to reduce rapid INR decrease.    Libia Jones, PharmD  PGY1 Pharmacy Practice Resident  [phone] lower back , right elbow/Right:

## 2024-03-05 NOTE — PATIENT PROFILE ADULT - FALLEN IN THE PAST
Controlled. Will increase Trulicity to 1.5 mg weekly.   Now using insulin inconsistently.   
Followed by orthopedist at [facility].   Also followed by pain mgmt.   
For SLE.   Now minimal use for flares only per Dr Wayne.   AVN hips.   HTN and DMII better controlled.  Osteopenia on DXA Jan, 2024.  
Repeat CBC and need iron studies dated 11/2023.  
Repeat thyroid ultrasound. Previously intolerant of biopsy.   Has seen Dr Jarvis previously for this.  
no